# Patient Record
Sex: FEMALE | Race: WHITE | NOT HISPANIC OR LATINO | Employment: OTHER | URBAN - NONMETROPOLITAN AREA
[De-identification: names, ages, dates, MRNs, and addresses within clinical notes are randomized per-mention and may not be internally consistent; named-entity substitution may affect disease eponyms.]

---

## 2020-01-04 ENCOUNTER — HOSPITAL ENCOUNTER (INPATIENT)
Facility: HOSPITAL | Age: 65
LOS: 4 days | Discharge: NURSING, REHAB OR LONG-TERM CARE | DRG: 194 | End: 2020-01-08
Attending: EMERGENCY MEDICINE | Admitting: INTERNAL MEDICINE
Payer: MEDICARE

## 2020-01-04 ENCOUNTER — APPOINTMENT (EMERGENCY)
Dept: CT IMAGING | Facility: HOSPITAL | Age: 65
DRG: 194 | End: 2020-01-04
Payer: MEDICARE

## 2020-01-04 DIAGNOSIS — M54.9 CHRONIC BACK PAIN, UNSPECIFIED BACK LOCATION, UNSPECIFIED BACK PAIN LATERALITY: ICD-10-CM

## 2020-01-04 DIAGNOSIS — M25.511 RIGHT SHOULDER PAIN: ICD-10-CM

## 2020-01-04 DIAGNOSIS — G89.29 CHRONIC BACK PAIN, UNSPECIFIED BACK LOCATION, UNSPECIFIED BACK PAIN LATERALITY: ICD-10-CM

## 2020-01-04 DIAGNOSIS — R26.2 AMBULATORY DYSFUNCTION: ICD-10-CM

## 2020-01-04 DIAGNOSIS — J18.9 PNEUMONIA: Primary | ICD-10-CM

## 2020-01-04 DIAGNOSIS — J44.9 CHRONIC OBSTRUCTIVE PULMONARY DISEASE, UNSPECIFIED COPD TYPE (HCC): ICD-10-CM

## 2020-01-04 DIAGNOSIS — R10.13 DYSPEPSIA: ICD-10-CM

## 2020-01-04 PROBLEM — R29.6 FREQUENT FALLS: Status: ACTIVE | Noted: 2020-01-04

## 2020-01-04 LAB
ALBUMIN SERPL BCP-MCNC: 3.3 G/DL (ref 3.5–5)
ALP SERPL-CCNC: 172 U/L (ref 46–116)
ALT SERPL W P-5'-P-CCNC: 46 U/L (ref 12–78)
ANION GAP SERPL CALCULATED.3IONS-SCNC: 5 MMOL/L (ref 4–13)
APTT PPP: 33 SECONDS (ref 23–37)
AST SERPL W P-5'-P-CCNC: 97 U/L (ref 5–45)
BASOPHILS # BLD AUTO: 0.04 THOUSANDS/ΜL (ref 0–0.1)
BASOPHILS NFR BLD AUTO: 1 % (ref 0–1)
BILIRUB SERPL-MCNC: 0.67 MG/DL (ref 0.2–1)
BILIRUB UR QL STRIP: NEGATIVE
BUN SERPL-MCNC: 18 MG/DL (ref 5–25)
CALCIUM SERPL-MCNC: 8.6 MG/DL (ref 8.3–10.1)
CHLORIDE SERPL-SCNC: 106 MMOL/L (ref 100–108)
CLARITY UR: CLEAR
CO2 SERPL-SCNC: 32 MMOL/L (ref 21–32)
COLOR UR: YELLOW
CREAT SERPL-MCNC: 0.82 MG/DL (ref 0.6–1.3)
EOSINOPHIL # BLD AUTO: 0.11 THOUSAND/ΜL (ref 0–0.61)
EOSINOPHIL NFR BLD AUTO: 2 % (ref 0–6)
ERYTHROCYTE [DISTWIDTH] IN BLOOD BY AUTOMATED COUNT: 15.8 % (ref 11.6–15.1)
FLUAV RNA NPH QL NAA+PROBE: NORMAL
FLUBV RNA NPH QL NAA+PROBE: NORMAL
GFR SERPL CREATININE-BSD FRML MDRD: 75 ML/MIN/1.73SQ M
GLUCOSE SERPL-MCNC: 80 MG/DL (ref 65–140)
GLUCOSE SERPL-MCNC: 82 MG/DL (ref 65–140)
GLUCOSE UR STRIP-MCNC: NEGATIVE MG/DL
HCT VFR BLD AUTO: 35.2 % (ref 34.8–46.1)
HGB BLD-MCNC: 11.4 G/DL (ref 11.5–15.4)
HGB UR QL STRIP.AUTO: NEGATIVE
IMM GRANULOCYTES # BLD AUTO: 0.01 THOUSAND/UL (ref 0–0.2)
IMM GRANULOCYTES NFR BLD AUTO: 0 % (ref 0–2)
INR PPP: 1.14 (ref 0.84–1.19)
KETONES UR STRIP-MCNC: NEGATIVE MG/DL
LACTATE SERPL-SCNC: 0.5 MMOL/L (ref 0.5–2)
LACTATE SERPL-SCNC: 1 MMOL/L (ref 0.5–2)
LEUKOCYTE ESTERASE UR QL STRIP: NEGATIVE
LYMPHOCYTES # BLD AUTO: 1.18 THOUSANDS/ΜL (ref 0.6–4.47)
LYMPHOCYTES NFR BLD AUTO: 23 % (ref 14–44)
MCH RBC QN AUTO: 33.6 PG (ref 26.8–34.3)
MCHC RBC AUTO-ENTMCNC: 32.4 G/DL (ref 31.4–37.4)
MCV RBC AUTO: 104 FL (ref 82–98)
MONOCYTES # BLD AUTO: 0.37 THOUSAND/ΜL (ref 0.17–1.22)
MONOCYTES NFR BLD AUTO: 7 % (ref 4–12)
NEUTROPHILS # BLD AUTO: 3.41 THOUSANDS/ΜL (ref 1.85–7.62)
NEUTS SEG NFR BLD AUTO: 67 % (ref 43–75)
NITRITE UR QL STRIP: NEGATIVE
NRBC BLD AUTO-RTO: 0 /100 WBCS
NT-PROBNP SERPL-MCNC: 2385 PG/ML
PH UR STRIP.AUTO: 6.5 [PH]
PLATELET # BLD AUTO: 115 THOUSANDS/UL (ref 149–390)
PMV BLD AUTO: 10.9 FL (ref 8.9–12.7)
POTASSIUM SERPL-SCNC: 4 MMOL/L (ref 3.5–5.3)
PROT SERPL-MCNC: 6.6 G/DL (ref 6.4–8.2)
PROT UR STRIP-MCNC: NEGATIVE MG/DL
PROTHROMBIN TIME: 14.6 SECONDS (ref 11.6–14.5)
RBC # BLD AUTO: 3.39 MILLION/UL (ref 3.81–5.12)
RSV RNA NPH QL NAA+PROBE: NORMAL
SODIUM SERPL-SCNC: 143 MMOL/L (ref 136–145)
SP GR UR STRIP.AUTO: 1.01 (ref 1–1.03)
TROPONIN I SERPL-MCNC: 0.02 NG/ML
UROBILINOGEN UR QL STRIP.AUTO: 0.2 E.U./DL
WBC # BLD AUTO: 5.12 THOUSAND/UL (ref 4.31–10.16)

## 2020-01-04 PROCEDURE — 72125 CT NECK SPINE W/O DYE: CPT

## 2020-01-04 PROCEDURE — 87086 URINE CULTURE/COLONY COUNT: CPT | Performed by: PHYSICIAN ASSISTANT

## 2020-01-04 PROCEDURE — 74177 CT ABD & PELVIS W/CONTRAST: CPT

## 2020-01-04 PROCEDURE — 99285 EMERGENCY DEPT VISIT HI MDM: CPT

## 2020-01-04 PROCEDURE — 81003 URINALYSIS AUTO W/O SCOPE: CPT | Performed by: PHYSICIAN ASSISTANT

## 2020-01-04 PROCEDURE — 96361 HYDRATE IV INFUSION ADD-ON: CPT

## 2020-01-04 PROCEDURE — 96360 HYDRATION IV INFUSION INIT: CPT

## 2020-01-04 PROCEDURE — 71260 CT THORAX DX C+: CPT

## 2020-01-04 PROCEDURE — 83605 ASSAY OF LACTIC ACID: CPT | Performed by: PHYSICIAN ASSISTANT

## 2020-01-04 PROCEDURE — 84484 ASSAY OF TROPONIN QUANT: CPT | Performed by: PHYSICIAN ASSISTANT

## 2020-01-04 PROCEDURE — 85025 COMPLETE CBC W/AUTO DIFF WBC: CPT | Performed by: PHYSICIAN ASSISTANT

## 2020-01-04 PROCEDURE — 99223 1ST HOSP IP/OBS HIGH 75: CPT | Performed by: NURSE PRACTITIONER

## 2020-01-04 PROCEDURE — 80053 COMPREHEN METABOLIC PANEL: CPT | Performed by: PHYSICIAN ASSISTANT

## 2020-01-04 PROCEDURE — 83880 ASSAY OF NATRIURETIC PEPTIDE: CPT | Performed by: PHYSICIAN ASSISTANT

## 2020-01-04 PROCEDURE — 70450 CT HEAD/BRAIN W/O DYE: CPT

## 2020-01-04 PROCEDURE — 36415 COLL VENOUS BLD VENIPUNCTURE: CPT | Performed by: PHYSICIAN ASSISTANT

## 2020-01-04 PROCEDURE — 87040 BLOOD CULTURE FOR BACTERIA: CPT | Performed by: PHYSICIAN ASSISTANT

## 2020-01-04 PROCEDURE — 85730 THROMBOPLASTIN TIME PARTIAL: CPT | Performed by: PHYSICIAN ASSISTANT

## 2020-01-04 PROCEDURE — 93005 ELECTROCARDIOGRAM TRACING: CPT

## 2020-01-04 PROCEDURE — 85610 PROTHROMBIN TIME: CPT | Performed by: PHYSICIAN ASSISTANT

## 2020-01-04 PROCEDURE — 87631 RESP VIRUS 3-5 TARGETS: CPT | Performed by: PHYSICIAN ASSISTANT

## 2020-01-04 PROCEDURE — 82948 REAGENT STRIP/BLOOD GLUCOSE: CPT

## 2020-01-04 RX ORDER — RIVASTIGMINE 9.5 MG/24H
9.5 PATCH, EXTENDED RELEASE TRANSDERMAL DAILY
Status: DISPENSED | OUTPATIENT
Start: 2020-01-04 | End: 2020-01-05

## 2020-01-04 RX ORDER — ASPIRIN 81 MG/1
81 TABLET, CHEWABLE ORAL DAILY
COMMUNITY

## 2020-01-04 RX ORDER — FUROSEMIDE 20 MG/1
20 TABLET ORAL 2 TIMES DAILY
COMMUNITY

## 2020-01-04 RX ORDER — FUROSEMIDE 10 MG/ML
40 INJECTION INTRAMUSCULAR; INTRAVENOUS ONCE
Status: COMPLETED | OUTPATIENT
Start: 2020-01-04 | End: 2020-01-04

## 2020-01-04 RX ORDER — KETOROLAC TROMETHAMINE 30 MG/ML
60 INJECTION, SOLUTION INTRAMUSCULAR; INTRAVENOUS ONCE
COMMUNITY
End: 2020-01-08 | Stop reason: HOSPADM

## 2020-01-04 RX ORDER — ONDANSETRON 2 MG/ML
4 INJECTION INTRAMUSCULAR; INTRAVENOUS EVERY 4 HOURS PRN
Status: DISCONTINUED | OUTPATIENT
Start: 2020-01-04 | End: 2020-01-08 | Stop reason: HOSPADM

## 2020-01-04 RX ORDER — ACETAMINOPHEN 325 MG/1
650 TABLET ORAL EVERY 4 HOURS PRN
Status: DISCONTINUED | OUTPATIENT
Start: 2020-01-04 | End: 2020-01-08 | Stop reason: HOSPADM

## 2020-01-04 RX ORDER — HYDROMORPHONE HCL 110MG/55ML
0.5 PATIENT CONTROLLED ANALGESIA SYRINGE INTRAVENOUS EVERY 4 HOURS PRN
Status: DISCONTINUED | OUTPATIENT
Start: 2020-01-04 | End: 2020-01-08 | Stop reason: HOSPADM

## 2020-01-04 RX ORDER — PANTOPRAZOLE SODIUM 40 MG/1
40 TABLET, DELAYED RELEASE ORAL DAILY
COMMUNITY

## 2020-01-04 RX ORDER — PREGABALIN 75 MG/1
150 CAPSULE ORAL 3 TIMES DAILY
COMMUNITY

## 2020-01-04 RX ORDER — AZITHROMYCIN 250 MG/1
500 TABLET, FILM COATED ORAL EVERY 24 HOURS
Status: DISCONTINUED | OUTPATIENT
Start: 2020-01-04 | End: 2020-01-08 | Stop reason: HOSPADM

## 2020-01-04 RX ORDER — LEVOFLOXACIN 5 MG/ML
750 INJECTION, SOLUTION INTRAVENOUS ONCE
Status: COMPLETED | OUTPATIENT
Start: 2020-01-04 | End: 2020-01-04

## 2020-01-04 RX ORDER — SERTRALINE HYDROCHLORIDE 100 MG/1
150 TABLET, FILM COATED ORAL DAILY
COMMUNITY

## 2020-01-04 RX ORDER — RIVASTIGMINE 9.5 MG/24H
1 PATCH, EXTENDED RELEASE TRANSDERMAL DAILY
Status: DISCONTINUED | OUTPATIENT
Start: 2020-01-04 | End: 2020-01-04

## 2020-01-04 RX ORDER — LANOLIN ALCOHOL/MO/W.PET/CERES
1000 CREAM (GRAM) TOPICAL DAILY
COMMUNITY

## 2020-01-04 RX ORDER — LORAZEPAM 1 MG/1
1 TABLET ORAL EVERY 8 HOURS PRN
COMMUNITY
End: 2020-01-08 | Stop reason: HOSPADM

## 2020-01-04 RX ORDER — PANTOPRAZOLE SODIUM 40 MG/1
40 TABLET, DELAYED RELEASE ORAL DAILY
Status: DISCONTINUED | OUTPATIENT
Start: 2020-01-05 | End: 2020-01-08 | Stop reason: HOSPADM

## 2020-01-04 RX ORDER — OXYCODONE HYDROCHLORIDE 10 MG/1
10 TABLET ORAL EVERY 4 HOURS PRN
Status: DISCONTINUED | OUTPATIENT
Start: 2020-01-04 | End: 2020-01-08 | Stop reason: HOSPADM

## 2020-01-04 RX ORDER — OXYCODONE HYDROCHLORIDE 5 MG/1
5 TABLET ORAL EVERY 4 HOURS PRN
Status: DISCONTINUED | OUTPATIENT
Start: 2020-01-04 | End: 2020-01-08 | Stop reason: HOSPADM

## 2020-01-04 RX ORDER — RIVASTIGMINE 9.5 MG/24H
1 PATCH, EXTENDED RELEASE TRANSDERMAL DAILY
Status: DISCONTINUED | OUTPATIENT
Start: 2020-01-05 | End: 2020-01-04

## 2020-01-04 RX ORDER — ROSUVASTATIN CALCIUM 10 MG/1
10 TABLET, COATED ORAL DAILY
COMMUNITY

## 2020-01-04 RX ORDER — PRAVASTATIN SODIUM 80 MG/1
80 TABLET ORAL
Status: DISCONTINUED | OUTPATIENT
Start: 2020-01-05 | End: 2020-01-08 | Stop reason: HOSPADM

## 2020-01-04 RX ORDER — PREGABALIN 75 MG/1
75 CAPSULE ORAL 3 TIMES DAILY
Status: DISCONTINUED | OUTPATIENT
Start: 2020-01-04 | End: 2020-01-05

## 2020-01-04 RX ORDER — MEMANTINE HYDROCHLORIDE 5 MG-10 MG
28 KIT ORAL SEE ADMIN INSTRUCTIONS
COMMUNITY

## 2020-01-04 RX ORDER — RIVASTIGMINE 13.3 MG/24H
1 PATCH, EXTENDED RELEASE TRANSDERMAL DAILY
COMMUNITY

## 2020-01-04 RX ORDER — CEFTRIAXONE 1 G/50ML
1000 INJECTION, SOLUTION INTRAVENOUS EVERY 24 HOURS
Status: DISCONTINUED | OUTPATIENT
Start: 2020-01-04 | End: 2020-01-08 | Stop reason: HOSPADM

## 2020-01-04 RX ORDER — OXYCODONE HCL 10 MG/1
10 TABLET, FILM COATED, EXTENDED RELEASE ORAL EVERY 12 HOURS SCHEDULED
COMMUNITY
End: 2020-01-08 | Stop reason: HOSPADM

## 2020-01-04 RX ORDER — ALLOPURINOL 100 MG/1
100 TABLET ORAL 2 TIMES DAILY
Status: DISCONTINUED | OUTPATIENT
Start: 2020-01-04 | End: 2020-01-08 | Stop reason: HOSPADM

## 2020-01-04 RX ORDER — RIVASTIGMINE 13.3 MG/24H
1 PATCH, EXTENDED RELEASE TRANSDERMAL DAILY
Status: DISCONTINUED | OUTPATIENT
Start: 2020-01-05 | End: 2020-01-04 | Stop reason: RX

## 2020-01-04 RX ORDER — ASPIRIN 81 MG/1
81 TABLET, CHEWABLE ORAL DAILY
Status: DISCONTINUED | OUTPATIENT
Start: 2020-01-05 | End: 2020-01-08 | Stop reason: HOSPADM

## 2020-01-04 RX ORDER — OXYBUTYNIN CHLORIDE 5 MG/1
10 TABLET, EXTENDED RELEASE ORAL DAILY
Status: DISCONTINUED | OUTPATIENT
Start: 2020-01-05 | End: 2020-01-08 | Stop reason: HOSPADM

## 2020-01-04 RX ORDER — ALLOPURINOL 100 MG/1
100 TABLET ORAL 2 TIMES DAILY
COMMUNITY

## 2020-01-04 RX ADMIN — FUROSEMIDE 40 MG: 10 INJECTION, SOLUTION INTRAMUSCULAR; INTRAVENOUS at 21:15

## 2020-01-04 RX ADMIN — CEFTRIAXONE 1000 MG: 1 INJECTION, SOLUTION INTRAVENOUS at 23:24

## 2020-01-04 RX ADMIN — LEVOFLOXACIN 750 MG: 5 INJECTION, SOLUTION INTRAVENOUS at 21:19

## 2020-01-04 RX ADMIN — AZITHROMYCIN 500 MG: 250 TABLET, FILM COATED ORAL at 23:09

## 2020-01-04 RX ADMIN — ALLOPURINOL 100 MG: 100 TABLET ORAL at 23:09

## 2020-01-04 RX ADMIN — PREGABALIN 75 MG: 75 CAPSULE ORAL at 23:09

## 2020-01-04 RX ADMIN — OXYCODONE HYDROCHLORIDE 10 MG: 10 TABLET ORAL at 23:09

## 2020-01-04 RX ADMIN — IOHEXOL 100 ML: 350 INJECTION, SOLUTION INTRAVENOUS at 19:05

## 2020-01-04 RX ADMIN — SODIUM CHLORIDE 1000 ML: 0.9 INJECTION, SOLUTION INTRAVENOUS at 18:07

## 2020-01-05 ENCOUNTER — APPOINTMENT (INPATIENT)
Dept: RADIOLOGY | Facility: HOSPITAL | Age: 65
DRG: 194 | End: 2020-01-05
Payer: MEDICARE

## 2020-01-05 PROBLEM — E11.9 DIABETES MELLITUS TYPE 2, DIET-CONTROLLED (HCC): Status: ACTIVE | Noted: 2020-01-05

## 2020-01-05 PROBLEM — F03.90 DEMENTIA (HCC): Status: ACTIVE | Noted: 2020-01-05

## 2020-01-05 PROBLEM — J18.9 PNEUMONIA: Status: ACTIVE | Noted: 2020-01-05

## 2020-01-05 PROBLEM — G89.29 CHRONIC BACK PAIN: Status: ACTIVE | Noted: 2020-01-05

## 2020-01-05 PROBLEM — R79.89 ELEVATED BRAIN NATRIURETIC PEPTIDE (BNP) LEVEL: Status: ACTIVE | Noted: 2020-01-05

## 2020-01-05 PROBLEM — D69.6 THROMBOCYTOPENIA (HCC): Status: ACTIVE | Noted: 2020-01-05

## 2020-01-05 PROBLEM — J44.9 COPD (CHRONIC OBSTRUCTIVE PULMONARY DISEASE) (HCC): Status: ACTIVE | Noted: 2020-01-05

## 2020-01-05 PROBLEM — M54.9 CHRONIC BACK PAIN: Status: ACTIVE | Noted: 2020-01-05

## 2020-01-05 LAB
ALBUMIN SERPL BCP-MCNC: 2.8 G/DL (ref 3.5–5)
ALP SERPL-CCNC: 134 U/L (ref 46–116)
ALT SERPL W P-5'-P-CCNC: 38 U/L (ref 12–78)
ANION GAP SERPL CALCULATED.3IONS-SCNC: 8 MMOL/L (ref 4–13)
AST SERPL W P-5'-P-CCNC: 108 U/L (ref 5–45)
BASOPHILS # BLD AUTO: 0.02 THOUSANDS/ΜL (ref 0–0.1)
BASOPHILS NFR BLD AUTO: 0 % (ref 0–1)
BILIRUB SERPL-MCNC: 0.78 MG/DL (ref 0.2–1)
BUN SERPL-MCNC: 18 MG/DL (ref 5–25)
CALCIUM SERPL-MCNC: 8.4 MG/DL (ref 8.3–10.1)
CHLORIDE SERPL-SCNC: 104 MMOL/L (ref 100–108)
CO2 SERPL-SCNC: 29 MMOL/L (ref 21–32)
CREAT SERPL-MCNC: 0.73 MG/DL (ref 0.6–1.3)
EOSINOPHIL # BLD AUTO: 0.19 THOUSAND/ΜL (ref 0–0.61)
EOSINOPHIL NFR BLD AUTO: 4 % (ref 0–6)
ERYTHROCYTE [DISTWIDTH] IN BLOOD BY AUTOMATED COUNT: 15.8 % (ref 11.6–15.1)
GFR SERPL CREATININE-BSD FRML MDRD: 87 ML/MIN/1.73SQ M
GLUCOSE SERPL-MCNC: 81 MG/DL (ref 65–140)
HCT VFR BLD AUTO: 33.4 % (ref 34.8–46.1)
HGB BLD-MCNC: 11 G/DL (ref 11.5–15.4)
IMM GRANULOCYTES # BLD AUTO: 0.01 THOUSAND/UL (ref 0–0.2)
IMM GRANULOCYTES NFR BLD AUTO: 0 % (ref 0–2)
LYMPHOCYTES # BLD AUTO: 0.73 THOUSANDS/ΜL (ref 0.6–4.47)
LYMPHOCYTES NFR BLD AUTO: 16 % (ref 14–44)
MCH RBC QN AUTO: 34 PG (ref 26.8–34.3)
MCHC RBC AUTO-ENTMCNC: 32.9 G/DL (ref 31.4–37.4)
MCV RBC AUTO: 103 FL (ref 82–98)
MONOCYTES # BLD AUTO: 0.35 THOUSAND/ΜL (ref 0.17–1.22)
MONOCYTES NFR BLD AUTO: 8 % (ref 4–12)
NEUTROPHILS # BLD AUTO: 3.3 THOUSANDS/ΜL (ref 1.85–7.62)
NEUTS SEG NFR BLD AUTO: 72 % (ref 43–75)
NRBC BLD AUTO-RTO: 0 /100 WBCS
PLATELET # BLD AUTO: 84 THOUSANDS/UL (ref 149–390)
PMV BLD AUTO: 10.5 FL (ref 8.9–12.7)
POTASSIUM SERPL-SCNC: 4.1 MMOL/L (ref 3.5–5.3)
PROCALCITONIN SERPL-MCNC: 0.21 NG/ML
PROCALCITONIN SERPL-MCNC: 0.22 NG/ML
PROT SERPL-MCNC: 6.2 G/DL (ref 6.4–8.2)
RBC # BLD AUTO: 3.24 MILLION/UL (ref 3.81–5.12)
SODIUM SERPL-SCNC: 141 MMOL/L (ref 136–145)
WBC # BLD AUTO: 4.6 THOUSAND/UL (ref 4.31–10.16)

## 2020-01-05 PROCEDURE — 85025 COMPLETE CBC W/AUTO DIFF WBC: CPT | Performed by: NURSE PRACTITIONER

## 2020-01-05 PROCEDURE — 84145 PROCALCITONIN (PCT): CPT | Performed by: NURSE PRACTITIONER

## 2020-01-05 PROCEDURE — 80053 COMPREHEN METABOLIC PANEL: CPT | Performed by: NURSE PRACTITIONER

## 2020-01-05 PROCEDURE — 73030 X-RAY EXAM OF SHOULDER: CPT

## 2020-01-05 PROCEDURE — 99232 SBSQ HOSP IP/OBS MODERATE 35: CPT | Performed by: FAMILY MEDICINE

## 2020-01-05 PROCEDURE — 99285 EMERGENCY DEPT VISIT HI MDM: CPT | Performed by: PHYSICIAN ASSISTANT

## 2020-01-05 RX ORDER — PREGABALIN 75 MG/1
150 CAPSULE ORAL 3 TIMES DAILY
Status: DISCONTINUED | OUTPATIENT
Start: 2020-01-05 | End: 2020-01-08 | Stop reason: HOSPADM

## 2020-01-05 RX ORDER — RIVASTIGMINE 9.5 MG/24H
9.5 PATCH, EXTENDED RELEASE TRANSDERMAL DAILY
Status: DISCONTINUED | OUTPATIENT
Start: 2020-01-06 | End: 2020-01-05

## 2020-01-05 RX ORDER — FUROSEMIDE 20 MG/1
20 TABLET ORAL 2 TIMES DAILY
Status: DISCONTINUED | OUTPATIENT
Start: 2020-01-05 | End: 2020-01-08 | Stop reason: HOSPADM

## 2020-01-05 RX ORDER — MEMANTINE HYDROCHLORIDE 10 MG/1
10 TABLET ORAL 2 TIMES DAILY
Status: DISCONTINUED | OUTPATIENT
Start: 2020-01-05 | End: 2020-01-08 | Stop reason: HOSPADM

## 2020-01-05 RX ORDER — CALCIUM CARBONATE 200(500)MG
500 TABLET,CHEWABLE ORAL DAILY PRN
Status: DISCONTINUED | OUTPATIENT
Start: 2020-01-05 | End: 2020-01-08 | Stop reason: HOSPADM

## 2020-01-05 RX ORDER — RIVASTIGMINE 13.3 MG/24H
13.3 PATCH, EXTENDED RELEASE TRANSDERMAL DAILY
Status: COMPLETED | OUTPATIENT
Start: 2020-01-06 | End: 2020-01-07

## 2020-01-05 RX ADMIN — SERTRALINE HYDROCHLORIDE 150 MG: 100 TABLET ORAL at 09:03

## 2020-01-05 RX ADMIN — PRAVASTATIN SODIUM 80 MG: 80 TABLET ORAL at 16:42

## 2020-01-05 RX ADMIN — FUROSEMIDE 20 MG: 20 TABLET ORAL at 17:00

## 2020-01-05 RX ADMIN — PREGABALIN 150 MG: 75 CAPSULE ORAL at 20:31

## 2020-01-05 RX ADMIN — ALLOPURINOL 100 MG: 100 TABLET ORAL at 08:57

## 2020-01-05 RX ADMIN — OXYCODONE HYDROCHLORIDE 10 MG: 10 TABLET ORAL at 04:26

## 2020-01-05 RX ADMIN — ALLOPURINOL 100 MG: 100 TABLET ORAL at 17:00

## 2020-01-05 RX ADMIN — ENOXAPARIN SODIUM 40 MG: 40 INJECTION SUBCUTANEOUS at 08:58

## 2020-01-05 RX ADMIN — CYANOCOBALAMIN TAB 500 MCG 1000 MCG: 500 TAB at 08:57

## 2020-01-05 RX ADMIN — OXYCODONE HYDROCHLORIDE 10 MG: 10 TABLET ORAL at 20:31

## 2020-01-05 RX ADMIN — PANTOPRAZOLE SODIUM 40 MG: 40 TABLET, DELAYED RELEASE ORAL at 09:04

## 2020-01-05 RX ADMIN — PREGABALIN 75 MG: 75 CAPSULE ORAL at 16:41

## 2020-01-05 RX ADMIN — FUROSEMIDE 20 MG: 20 TABLET ORAL at 08:57

## 2020-01-05 RX ADMIN — MEMANTINE 10 MG: 10 TABLET ORAL at 17:00

## 2020-01-05 RX ADMIN — PREGABALIN 75 MG: 75 CAPSULE ORAL at 08:57

## 2020-01-05 RX ADMIN — MEMANTINE 10 MG: 10 TABLET ORAL at 08:59

## 2020-01-05 RX ADMIN — ASPIRIN 81 MG 81 MG: 81 TABLET ORAL at 08:58

## 2020-01-05 RX ADMIN — OXYCODONE HYDROCHLORIDE 10 MG: 10 TABLET ORAL at 12:52

## 2020-01-05 RX ADMIN — CEFTRIAXONE 1000 MG: 1 INJECTION, SOLUTION INTRAVENOUS at 21:31

## 2020-01-05 RX ADMIN — OXYBUTYNIN CHLORIDE 10 MG: 5 TABLET, EXTENDED RELEASE ORAL at 08:57

## 2020-01-05 RX ADMIN — AZITHROMYCIN 500 MG: 250 TABLET, FILM COATED ORAL at 21:31

## 2020-01-05 RX ADMIN — ANTACID TABLETS 500 MG: 500 TABLET, CHEWABLE ORAL at 00:59

## 2020-01-05 NOTE — ASSESSMENT & PLAN NOTE
· Has history of dementia, is oriented x4 on exam  · Uses Exelon patch  Her dose of 13 3 mg per 24 hours is not formulary  Will use available 9 mg patch and patient may use own  Friend will bring in tomorrow  · Takes Namenda 28 mg XR-non formulary  Will use Namenda 10 mg b i d  Digna Bullock   · Supportive care

## 2020-01-05 NOTE — ASSESSMENT & PLAN NOTE
· Initial presentation was for frequent falls and increased weakness  · CT chest abdomen pelvis with contrast:  Right middle and lower lobe opacity suggesting pneumonia  · Initial O2 sat in the ER was 89% on room air    Respirations recorded at 26  · Flu swab:  Negative  · Afebrile and without leukocytosis  · Received Levaquin in the ER  · Blood cultures are pending  · Check procalcitonin  · Obtain sputum culture  · Check urinary respiratory antigens  · Change antibiotics to azithromycin and ceftriaxone  · Deescalate antibiotics as able  · Oxygen protocol

## 2020-01-05 NOTE — PLAN OF CARE
Problem: MUSCULOSKELETAL - ADULT  Goal: Maintain or return mobility to safest level of function  Description  INTERVENTIONS:  - Assess patient's ability to carry out ADLs; assess patient's baseline for ADL function and identify physical deficits which impact ability to perform ADLs (bathing, care of mouth/teeth, toileting, grooming, dressing, etc )  - Assess/evaluate cause of self-care deficits   - Assess range of motion  - Assess patient's mobility  - Assess patient's need for assistive devices and provide as appropriate/roller walker  - Encourage maximum independence but intervene and supervise when necessary  - Involve family in performance of ADLs  - Assess for home care needs following discharge   - Consider OT consult to assist with ADL evaluation and planning for discharge  - Provide patient education as appropriate   Outcome: Not Progressing, history of recent falls unable to ambulate safely  Goal: Maintain proper alignment of affected body part  Description  INTERVENTIONS:  - Support, maintain and protect limb and body alignment  - Provide patient/ family with appropriate education  Outcome: Not Progressing

## 2020-01-05 NOTE — PLAN OF CARE
Problem: Potential for Falls  Goal: Patient will remain free of falls  Description  INTERVENTIONS:  - Assess patient frequently for physical needs  -  Identify cognitive and physical deficits and behaviors that affect risk of falls    -  [place] fall precautions as indicated by assessment   - Educate patient/family on patient safety including physical limitations  - Instruct patient to call for assistance with activity based on assessment  - Modify environment to reduce risk of injury  - Consider OT/PT consult to assist with strengthening/mobility  Outcome: Not Progressing     Problem: PAIN - ADULT  Goal: Verbalizes/displays adequate comfort level or baseline comfort level  Description  Interventions:  - Encourage patient to monitor pain and request assistance  - Assess pain using appropriate pain scale  - Administer analgesics based on type and severity of pain and evaluate response  - Implement non-pharmacological measures as appropriate and evaluate response  - Consider cultural and social influences on pain and pain management  - Notify physician/advanced practitioner if interventions unsuccessful or patient reports new pain  Outcome: Not Progressing     Problem: INFECTION - ADULT  Goal: Absence or prevention of progression during hospitalization  Description  INTERVENTIONS:  - Assess and monitor for signs and symptoms of infection  - Monitor lab/diagnostic results  - Monitor all insertion sites, i e  indwelling lines, tubes, and drains  - Monitor endotracheal if appropriate and nasal secretions for changes in amount and color  - [place] appropriate cooling/warming therapies per order  - Administer medications as ordered  - Instruct and encourage patient and family to use good hand hygiene technique  - Identify and instruct in appropriate isolation precautions for identified infection/condition  Outcome: Not Progressing  Goal: Absence of fever/infection during neutropenic period  Description  INTERVENTIONS:  - Monitor WBC    Outcome: Not Progressing     Problem: SAFETY ADULT  Goal: Maintain or return to baseline ADL function  Description  INTERVENTIONS:  -  Assess patient's ability to carry out ADLs; assess patient's baseline for ADL function and identify physical deficits which impact ability to perform ADLs (bathing, care of mouth/teeth, toileting, grooming, dressing, etc )  - Assess/evaluate cause of self-care deficits   - Assess range of motion  - Assess patient's mobility; develop plan if impaired  - Assess patient's need for assistive devices and provide as appropriate  - Encourage maximum independence but intervene and supervise when necessary  - Involve family in performance of ADLs  - Assess for home care needs following discharge   - Consider OT consult to assist with ADL evaluation and planning for discharge  - Provide patient education as appropriate  Outcome: Not Progressing  Goal: Maintain or return mobility status to optimal level  Description  INTERVENTIONS:  - Assess patient's baseline mobility status (ambulation, transfers, stairs, etc )    - Identify cognitive and physical deficits and behaviors that affect mobility  - Identify mobility aids required to assist with transfers and/or ambulation (gait belt, sit-to-stand, lift, walker, cane, etc )  - [place] fall precautions as indicated by assessment  - Record patient progress and toleration of activity level on Mobility SBAR; progress patient to next Phase/Stage  - Instruct patient to call for assistance with activity based on assessment  - Consider rehabilitation consult to assist with strengthening/weightbearing, etc   Outcome: Not Progressing     Problem: DISCHARGE PLANNING  Goal: Discharge to home or other facility with appropriate resources  Description  INTERVENTIONS:  - Identify barriers to discharge w/patient and caregiver  - Arrange for needed discharge resources and transportation as appropriate  - Identify discharge learning needs (meds, wound care, etc )  - Arrange for interpretive services to assist at discharge as needed  - Refer to Case Management Department for coordinating discharge planning if the patient needs post-hospital services based on physician/advanced practitioner order or complex needs related to functional status, cognitive ability, or social support system  Outcome: Not Progressing     Problem: Knowledge Deficit  Goal: Patient/family/caregiver demonstrates understanding of disease process, treatment plan, medications, and discharge instructions  Description  Complete learning assessment and assess knowledge base    Interventions:  - Provide teaching at level of understanding  - Provide teaching via preferred learning methods  Outcome: Not Progressing     Problem: RESPIRATORY - ADULT  Goal: Achieves optimal ventilation and oxygenation  Description  INTERVENTIONS:  - Assess for changes in respiratory status  - Assess for changes in mentation and behavior  - Position to facilitate oxygenation and minimize respiratory effort  - Oxygen administered by appropriate delivery if ordered  - Initiate smoking cessation education as indicated  - Encourage broncho-pulmonary hygiene including cough, deep breathe, Incentive Spirometry  - Assess the need for suctioning and aspirate as needed  - Assess and instruct to report SOB or any respiratory difficulty  - Respiratory Therapy support as indicated  Outcome: Not Progressing     Problem: SKIN/TISSUE INTEGRITY - ADULT  Goal: Skin integrity remains intact  Description  INTERVENTIONS  - Identify patients at risk for skin breakdown  - Assess and monitor skin integrity  - Assess and monitor nutrition and hydration status  - Monitor labs (i e  albumin)  - Assess for incontinence   - Turn and reposition patient  - Assist with mobility/ambulation  - Relieve pressure over bony prominences  - Avoid friction and shearing  - Provide appropriate hygiene as needed including keeping skin clean and dry  - Evaluate need for skin moisturizer/barrier cream  - Collaborate with interdisciplinary team (i e  Nutrition, Rehabilitation, etc )   - Patient/family teaching  Outcome: Not Progressing  Goal: Incision(s), wounds(s) or drain site(s) healing without S/S of infection  Description  INTERVENTIONS  - Assess and document risk factors for skin impairment   - Assess and document dressing, incision, wound bed, drain sites and surrounding tissue  - Consider nutrition services referral as needed  - Oral mucous membranes remain intact  - Provide patient/ family education  Outcome: Not Progressing  Goal: Oral mucous membranes remain intact  Description  INTERVENTIONS  - Assess oral mucosa and hygiene practices  - Implement preventative oral hygiene regimen  - Implement oral medicated treatments as ordered  - Initiate Nutrition services referral as needed  Outcome: Not Progressing     Problem: MUSCULOSKELETAL - ADULT  Goal: Maintain or return mobility to safest level of function  Description  INTERVENTIONS:  - Assess patient's ability to carry out ADLs; assess patient's baseline for ADL function and identify physical deficits which impact ability to perform ADLs (bathing, care of mouth/teeth, toileting, grooming, dressing, etc )  - Assess/evaluate cause of self-care deficits   - Assess range of motion  - Assess patient's mobility  - Assess patient's need for assistive devices and provide as appropriate  - Encourage maximum independence but intervene and supervise when necessary  - Involve family in performance of ADLs  - Assess for home care needs following discharge   - Consider OT consult to assist with ADL evaluation and planning for discharge  - Provide patient education as appropriate  Outcome: Not Progressing  Goal: Maintain proper alignment of affected body part  Description  INTERVENTIONS:  - Support, maintain and protect limb and body alignment  - Provide patient/ family with appropriate education  Outcome: Not Progressing

## 2020-01-05 NOTE — NURSING NOTE
Patient became agitated when her belongings would not be removed from sealed plastic bags and stated that this place was becoming a "shit-show" and wanted to leave  It was explained that she had the right to leave if she was not happy and then stated that I should come over to her so that she could "punch me in the throat"  The nursing supervisor was immediately notified and she went in to talk to the patient

## 2020-01-05 NOTE — ASSESSMENT & PLAN NOTE
· BNP 2385  · Does not appear volume overloaded on exam  · No history of CHF and old records  · BNP from September 2019 is 3600  · Is on Lasix daily-will continue  · Check echocardiogram

## 2020-01-05 NOTE — PROGRESS NOTES
Progress Note - Jaz Marrow 1955, 71 y o  female MRN: [id number]    Unit/Bed#: -01 Encounter: [id number]    Primary Care Provider: Sulma Cevallos MD   Date and time admitted to hospital: 1/4/2020  4:49 PM        * Frequent falls  Assessment & Plan  · Initial presentation was for frequent falls and increased weakness  · PT, OT, case management consult  · Will likely need rehab    COPD (chronic obstructive pulmonary disease) ([facility])  Assessment & Plan  · Quit smoking 2 years ago  · Does not appear to be in exacerbation  · CT chest abdomen pelvis shows mild-to-moderate emphysema  · Oxygen protocol  · Respiratory protocol  · Monitor    Elevated brain natriuretic peptide (BNP) level  Assessment & Plan  · BNP 2385  · Does not appear volume overloaded on exam  · No history of CHF and old records  · BNP from September 2019 is 3600  · Is on Lasix daily-will continue  · Check echocardiogram    Diabetes mellitus type 2, diet-controlled ([facility] [address] )  Assessment & Plan  No results found for: HGBA1C    Recent Labs     01/04/20  1709   POCGLU 82       Blood Sugar Average: Last 72 hrs:  (P) 82   · Patient refuses diabetic diet  · Blood sugar on BMP appears to be stable    Dementia ([facility])  Assessment & Plan  · Has history of dementia, is oriented x4 on exam  · Uses Exelon patch  Her dose of 13 3 mg per 24 hours is not formulary  Will use available 9 mg patch and patient may use own  Friend will bring in tomorrow  · Takes Namenda 28 mg XR-non formulary  Will use Namenda 10 mg b i d  [facility] · Supportive care    Pneumonia  Assessment & Plan  · Initial presentation was for frequent falls and increased weakness  · CT chest abdomen pelvis with contrast:  Right middle and lower lobe opacity suggesting pneumonia  · Initial O2 sat in the ER was 89% on room air    Respirations recorded at 26  · Flu swab:  Negative  · Afebrile and without leukocytosis  · Received Levaquin in the ER  · Patient do not have any respiratory symptoms  · Continue with the antibiotics for now  · Follow-up on the procalcitonin  · Deescalate antibiotics quickly    Chronic back pain  Assessment & Plan  · History of chronic back pain due to disc disease  · Takes oxycodone 10 mg p r n  At home  · Will use Adult Pain Management set    Thrombocytopenia (HCC)  Assessment & Plan  · Platelets 850-LLIW to 84  · Daily CBC and monitor platelets      VTE Pharmacologic Prophylaxis:   Pharmacologic: Enoxaparin (Lovenox)  Mechanical VTE Prophylaxis in Place: Yes    Patient Centered Rounds: I have performed bedside rounds with nursing staff today  Discussions with Specialists or Other Care Team Provider:     Education and Discussions with Family / Patient:  Patient    Time Spent for Care: 30 minutes  More than 50% of total time spent on counseling and coordination of care as described above  Current Length of Stay: 1 day(s)    Current Patient Status: Inpatient   Certification Statement: The patient will continue to require additional inpatient hospital stay due to IV antibiotics    Discharge Plan:     Code Status: Level 1 - Full Code      Subjective:   Patient seen and examined  Patient denies any cough or any respiratory symptoms  Denies any fever  Patient came to the hospital with the recurrent falls  CT scan is suspicious for pneumonia  Patient is also complaining of right shoulder pain and decreased range of movement  Objective:     Vitals:   Temp (24hrs), Av 3 °F (36 8 °C), Min:97 6 °F (36 4 °C), Max:99 °F (37 2 °C)    Temp:  [97 6 °F (36 4 °C)-99 °F (37 2 °C)] 99 °F (37 2 °C)  HR:  [60-72] 64  Resp:  [16-26] 18  BP: (112-171)/(58-93) 112/60  SpO2:  [89 %-98 %] 95 %  Body mass index is 40 13 kg/m²  Input and Output Summary (last 24 hours):        Intake/Output Summary (Last 24 hours) at 2020 1652  Last data filed at 2020 1401  Gross per 24 hour   Intake 1000 ml   Output 925 ml   Net 75 ml       Physical Exam:     Physical Exam Constitutional: She appears well-developed  HENT:   Wound on the scalp from previous fall, appears to be healing   Eyes: Pupils are equal, round, and reactive to light  Neck: No JVD present  Cardiovascular: Normal rate  No murmur heard  Pulmonary/Chest:   Decreased breath sounds bilateral   Abdominal: Soft  A hernia is present  Ventral hernia   Musculoskeletal:   Decreased range of movement of the right shoulder  Wounds on the right lower extremity-appears to be scabbed and healing   Neurological: She is alert  Skin: Skin is warm  Additional Data:     Labs:    Results from last 7 days   Lab Units 01/05/20  0647   WBC Thousand/uL 4 60   HEMOGLOBIN g/dL 11 0*   HEMATOCRIT % 33 4*   PLATELETS Thousands/uL 84*   NEUTROS PCT % 72   LYMPHS PCT % 16   MONOS PCT % 8   EOS PCT % 4     Results from last 7 days   Lab Units 01/05/20  0647   POTASSIUM mmol/L 4 1   CHLORIDE mmol/L 104   CO2 mmol/L 29   BUN mg/dL 18   CREATININE mg/dL 0 73   CALCIUM mg/dL 8 4   ALK PHOS U/L 134*   ALT U/L 38   AST U/L 108*     Results from last 7 days   Lab Units 01/04/20  1758   INR  1 14       * I Have Reviewed All Lab Data Listed Above  * Additional Pertinent Lab Tests Reviewed: Jenna 66 Admission Reviewed    Imaging:    Imaging Reports Reviewed Today Include:   Imaging Personally Reviewed by Myself Includes:    Recent Cultures (last 7 days):     Results from last 7 days   Lab Units 01/04/20  1757 01/04/20  1756   BLOOD CULTURE  Received in Microbiology Lab  Culture in Progress  Received in Microbiology Lab  Culture in Progress         Last 24 Hours Medication List:     Current Facility-Administered Medications:  acetaminophen 650 mg Oral Q4H PRN Yahir Huddle, CRNP    allopurinol 100 mg Oral BID [place] Huddle, CRNP    aspirin 81 mg Oral Daily Yahir Huddle, CRNP    cefTRIAXone 1,000 mg Intravenous Q24H Yahir Huddle, CRNP Last Rate: 1,000 mg (01/04/20 1735)   And        azithromycin 500 mg Oral Q24H Tresea Pill, CRNP    calcium carbonate 500 mg Oral Daily PRN Tresea Pill, CRNP    vitamin B-12 1,000 mcg Oral Daily Tresea Pill, CRNP    enoxaparin 40 mg Subcutaneous Daily Tresea Pill, CRNP    furosemide 20 mg Oral BID Tresea Pill, CRNP    HYDROmorphone 0 5 mg Intravenous Q4H PRN Tresea Pill, CRNP    memantine 10 mg Oral BID Tresea Pill, CRNP    naloxone 0 04 mg Intravenous Q1MIN PRN Tresea Pill, CRNP    NON FORMULARY 13 3 mg/24 hr Transdermal QPM Tresea Pill, CRNP    ondansetron 4 mg Intravenous Q4H PRN Tresea Pill, CRNP    oxybutynin 10 mg Oral Daily Tresea Pill, CRNP    oxyCODONE 10 mg Oral Q4H PRN Tresea Pill, CRNP    oxyCODONE 5 mg Oral Q4H PRN Tresea Pill, CRNP    pantoprazole 40 mg Oral Daily Tresea Pill, CRNP    pravastatin 80 mg Oral Daily With [facility], CRNP    pregabalin 75 mg Oral TID Tresea Pill, CRNP    [START ON 1/6/2020] rivastigmine 9 5 mg Transdermal Daily Sharif Jesus MD    sertraline 150 mg Oral Daily Tresea Pill, CRNP         Today, Patient Was Seen By: Sharif Jesus MD    ** Please Note: Dictation voice to text software may have been used in the creation of this document   **

## 2020-01-05 NOTE — ASSESSMENT & PLAN NOTE
· Has history of dementia, is oriented x4 on exam  · Uses Exelon patch  Her dose of 13 3 mg per 24 hours is not formulary  Will use available 9 mg patch and patient may use own  Friend will bring in tomorrow  · Takes Namenda 28 mg XR-non formulary  Will use Namenda 10 mg b i d  Bacilio Ramirez   · Supportive care

## 2020-01-05 NOTE — ASSESSMENT & PLAN NOTE
· Quit smoking 2 years ago  · Does not appear to be in exacerbation  · CT chest abdomen pelvis shows mild-to-moderate emphysema  · Oxygen protocol  · Respiratory protocol  · Monitor

## 2020-01-05 NOTE — ASSESSMENT & PLAN NOTE
No results found for: HGBA1C    Recent Labs     01/04/20  1709   POCGLU 82       Blood Sugar Average: Last 72 hrs:  (P) 82   · Patient refuses diabetic diet  · Blood sugar on BMP appears to be stable

## 2020-01-05 NOTE — ASSESSMENT & PLAN NOTE
· Initial presentation was for frequent falls and increased weakness  · PT, OT, case management consult  · Will likely need rehab

## 2020-01-05 NOTE — H&P
[facility] Internal Medicine    H&P- Chacha Chamberlain 1955, 71 y o  female MRN: [id number]    Unit/Bed#: -01 Encounter: [id number]    Primary Care Provider: Jose Vargas MD   Date and time admitted to hospital: 1/4/2020  4:49 PM        * Pneumonia  Assessment & Plan  · Initial presentation was for frequent falls and increased weakness  · CT chest abdomen pelvis with contrast:  Right middle and lower lobe opacity suggesting pneumonia  · Initial O2 sat in the ER was 89% on room air    Respirations recorded at 26  · Flu swab:  Negative  · Afebrile and without leukocytosis  · Received Levaquin in the ER  · Blood cultures are pending  · Check procalcitonin  · Obtain sputum culture  · Check urinary respiratory antigens  · Change antibiotics to azithromycin and ceftriaxone  · Deescalate antibiotics as able  · Oxygen protocol    Frequent falls  Assessment & Plan  · Initial presentation was for frequent falls and increased weakness  · PT, OT, case management consult  · Will likely need rehab    Thrombocytopenia (HCC)  Assessment & Plan  · Platelets 399  · Daily CBC and monitor platelets    COPD (chronic obstructive pulmonary disease) (HCC)  Assessment & Plan  · Quit smoking 2 years ago  · Does not appear to be in exacerbation  · CT chest abdomen pelvis shows mild-to-moderate emphysema  · Oxygen protocol  · Respiratory protocol  · Monitor    Elevated brain natriuretic peptide (BNP) level  Assessment & Plan  · BNP 2385  · Does not appear volume overloaded on exam  · No history of CHF and old records  · BNP from September 2019 is 3600  · Is on Lasix daily-will continue  · Check echocardiogram    Diabetes mellitus type 2, diet-controlled ([facility] )  Assessment & Plan  No results found for: HGBA1C    Recent Labs     01/04/20  1709   POCGLU 82       Blood Sugar Average: Last 72 hrs:  (P) 82     · Carb controlled diet  · Daily metabolic panel and trend glucose    Dementia (HCC)  Assessment & Plan  · Has history of dementia, is oriented x4 on exam  · Uses Exelon patch  Her dose of 13 3 mg per 24 hours is not formulary  Will use available 9 mg patch and patient may use own  Friend will bring in tomorrow  · Takes Namenda 28 mg XR-non formulary  Will use Namenda 10 mg b i d  Radha Sprague · Supportive care    Chronic back pain  Assessment & Plan  · History of chronic back pain due to disc disease  · Takes oxycodone 10 mg p r n  At home  · Will use Adult Pain Management set      VTE Prophylaxis: Enoxaparin (Lovenox)  / sequential compression device   Code Status:  Full  POLST: POLST form is not discussed and not completed at this time  Discussion with family:  Patient    Anticipated Length of Stay:  Patient will be admitted on an Inpatient basis with an anticipated length of stay of  greater than 2 midnights  Justification for Hospital Stay:  As described in plan above    Total Time for Visit, including Counseling / Coordination of Care: 45 minutes  Greater than 50% of this total time spent on direct patient counseling and coordination of care  Chief Complaint:   Frequent falls    History of Present Illness:    Lexx Shlel is a 71 y o  female with history of diet-controlled diabetes, COPD, dementia, and ventral hernias who presents with frequent falls  She says that she has become progressively more weak over the past several weeks  She normally ambulates with a walker and is finding it difficult to ambulate at all  She says she may have had a fever, and has had a nonproductive cough  She denies chest pain  No nausea or vomiting  Denies diarrhea  Denies palpitations or leg swelling  Denies focal weakness  Review of Systems:    Review of Systems   Constitutional: Positive for fatigue  Negative for chills and fever  Respiratory: Positive for cough  Negative for shortness of breath  Cardiovascular: Negative for chest pain, palpitations and leg swelling     Gastrointestinal: Negative for abdominal distention, abdominal pain, constipation, diarrhea, nausea and vomiting  Genitourinary: Negative for dysuria and frequency  Musculoskeletal: Positive for gait problem  Negative for arthralgias and myalgias  Neurological: Positive for weakness  Negative for dizziness, syncope and headaches  Psychiatric/Behavioral: Negative for dysphoric mood  All other systems reviewed and are negative  Past Medical and Surgical History:     Past Medical History:   Diagnosis Date    Cardiac disease     Spinal stenosis        No past surgical history on file  Meds/Allergies:    Prior to Admission medications    Medication Sig Start Date End Date Taking? Authorizing Provider   allopurinol (ZYLOPRIM) 100 mg tablet Take 100 mg by mouth 2 (two) times a day    Historical Provider, MD   aspirin 81 mg chewable tablet Chew 81 mg daily    Historical Provider, MD   furosemide (LASIX) 20 mg tablet Take 20 mg by mouth 2 (two) times a day    Historical Provider, MD   ketorolac (TORADOL) 30 mg/mL injection Inject 60 mg into a muscle once    Historical Provider, MD   LORazepam (ATIVAN) 1 mg tablet Take 1 mg by mouth every 8 (eight) hours as needed for anxiety    Historical Provider, MD   memantine (NAMENDA TITRATION PACK) Take 28 mg by mouth see administration instructions Follow package directions      Historical Provider, MD   Mirabegron ER 50 MG TB24 Take 50 mg by mouth daily    Historical Provider, MD   oxyCODONE (OxyCONTIN) 10 mg 12 hr tablet Take 10 mg by mouth every 12 (twelve) hours    Historical Provider, MD   pantoprazole (PROTONIX) 40 mg tablet Take 40 mg by mouth daily    Historical Provider, MD   pregabalin (LYRICA) 75 mg capsule Take 75 mg by mouth 3 (three) times a day    Historical Provider, MD   rivastigmine (EXELON) 13 3 mg/24 hr TD 24 hr patch Place 1 patch on the skin daily    Historical Provider, MD   rosuvastatin (CRESTOR) 10 MG tablet Take 10 mg by mouth daily    Historical Provider, MD   sertraline (ZOLOFT) 100 mg tablet Take 150 mg by mouth daily    Historical Provider, MD   vitamin B-12 (VITAMIN B-12) 1,000 mcg tablet Take 1,000 mcg by mouth daily    Historical Provider, MD     I have reviewed home medications with patient personally  Allergies: No Known Allergies    Social History:     Marital Status:    Occupation:  Retired Front office medical  Patient Pre-hospital Living Situation:  Lives at home with live-in caregiver of 20 years  Patient Pre-hospital Level of Mobility:  Walks with walker  Patient Pre-hospital Diet Restrictions:  No concentrated sweets  Substance Use History:   Social History     Substance and Sexual Activity   Alcohol Use Never    Frequency: Never     Social History     Tobacco Use   Smoking Status Unknown If Ever Smoked   Smokeless Tobacco Never Used     Social History     Substance and Sexual Activity   Drug Use Never       Family History:    History reviewed  No pertinent family history  Physical Exam:     Vitals:   Blood Pressure: 142/85 (01/05/20 0001)  Pulse: 60 (01/05/20 0001)  Temperature: 98 2 °F (36 8 °C) (01/05/20 0001)  Respirations: 16 (01/05/20 0001)  Height: 5' 3 5" (161 3 cm) (01/05/20 0001)  Weight - Scale: 104 kg (230 lb 2 6 oz) (01/05/20 0600)  SpO2: 94 % (01/05/20 0001)    Physical Exam   Constitutional: She is oriented to person, place, and time  She appears well-developed and well-nourished  HENT:   Head: Normocephalic and atraumatic  Mouth/Throat: Oropharynx is clear and moist    Eyes: Pupils are equal, round, and reactive to light  EOM are normal    Neck: Normal range of motion  Neck supple  Cardiovascular: Normal rate, regular rhythm, normal heart sounds and intact distal pulses  Exam reveals no gallop and no friction rub  No murmur heard  Pulmonary/Chest: Effort normal and breath sounds normal  No respiratory distress  Abdominal: Soft  Bowel sounds are normal  She exhibits mass  She exhibits no distension  There is no tenderness  There is no guarding  Two large soft nontender masses to mid to left abdomen   Musculoskeletal: Normal range of motion  She exhibits no edema, tenderness or deformity  Neurological: She is alert and oriented to person, place, and time  Skin: Skin is warm and dry  Capillary refill takes less than 2 seconds  Multiple scabbed areas to right lower extremity + evidence of venous stasis brown discoloration   Nursing note and vitals reviewed  Additional Data:     Lab Results: I have personally reviewed pertinent reports  Results from last 7 days   Lab Units 01/04/20  1757   WBC Thousand/uL 5 12   HEMOGLOBIN g/dL 11 4*   HEMATOCRIT % 35 2   PLATELETS Thousands/uL 115*   NEUTROS PCT % 67   LYMPHS PCT % 23   MONOS PCT % 7   EOS PCT % 2     Results from last 7 days   Lab Units 01/04/20  1758   SODIUM mmol/L 143   POTASSIUM mmol/L 4 0   CHLORIDE mmol/L 106   CO2 mmol/L 32   BUN mg/dL 18   CREATININE mg/dL 0 82   ANION GAP mmol/L 5   CALCIUM mg/dL 8 6   ALBUMIN g/dL 3 3*   TOTAL BILIRUBIN mg/dL 0 67   ALK PHOS U/L 172*   ALT U/L 46   AST U/L 97*   GLUCOSE RANDOM mg/dL 80     Results from last 7 days   Lab Units 01/04/20  1758   INR  1 14     Results from last 7 days   Lab Units 01/04/20  1709   POC GLUCOSE mg/dl 82         Results from last 7 days   Lab Units 01/04/20  1958 01/04/20  1759   LACTIC ACID mmol/L 1 0 0 5       Imaging: I have personally reviewed pertinent reports  CT head without contrast   Final Result by Donte Brink MD (01/04 1020)      No acute intracranial abnormality  Chronic right basal ganglia lacunar infarct  Workstation performed: [id number]         CT cervical spine without contrast   Final Result by Donte Brink MD (01/04 1919)      No cervical spine fracture or traumatic malalignment  Workstation performed: [id number]         CT chest abdomen pelvis w contrast   Final Result by Donte Brink MD (01/04 1925)      No traumatic abnormality identified        Mild to moderate emphysema  Right middle and lower lobe opacities suggesting pneumonia  Cardiomegaly  Cholelithiasis  Ventral hernias as described  Workstation performed: [id number]             EKG, Pathology, and Other Studies Reviewed on Admission:   · CT head:  No acute intracranial abnormality  Chronic right basal ganglia lacunar infarct  Allscripts / Epic Records Reviewed: Yes     ** Please Note: This note has been constructed using a voice recognition system   **

## 2020-01-05 NOTE — PLAN OF CARE
Problem: Potential for Falls  Goal: Patient will remain free of falls  Description  INTERVENTIONS:  - Assess patient frequently for physical needs  -  Identify cognitive and physical deficits and behaviors that affect risk of falls    -  [place] fall precautions as indicated by assessment   - Educate patient/family on patient safety including physical limitations  - Instruct patient to call for assistance with activity based on assessment  - Modify environment to reduce risk of injury  - Consider OT/PT consult to assist with strengthening/mobility  Outcome: Progressing     Problem: PAIN - ADULT  Goal: Verbalizes/displays adequate comfort level or baseline comfort level  Description  Interventions:  - Encourage patient to monitor pain and request assistance  - Assess pain using appropriate pain scale  - Administer analgesics based on type and severity of pain and evaluate response  - Implement non-pharmacological measures as appropriate and evaluate response  - Consider cultural and social influences on pain and pain management  - Notify physician/advanced practitioner if interventions unsuccessful or patient reports new pain  Outcome: Progressing     Problem: INFECTION - ADULT  Goal: Absence or prevention of progression during hospitalization  Description  INTERVENTIONS:  - Assess and monitor for signs and symptoms of infection  - Monitor lab/diagnostic results  - Monitor all insertion sites, i e  indwelling lines, tubes, and drains  - Monitor endotracheal if appropriate and nasal secretions for changes in amount and color  - [place] appropriate cooling/warming therapies per order  - Administer medications as ordered  - Instruct and encourage patient and family to use good hand hygiene technique  - Identify and instruct in appropriate isolation precautions for identified infection/condition  Outcome: Progressing  Goal: Absence of fever/infection during neutropenic period  Description  INTERVENTIONS:  - Monitor WBC    Outcome: Progressing     Problem: SAFETY ADULT  Goal: Maintain or return to baseline ADL function  Description  INTERVENTIONS:  -  Assess patient's ability to carry out ADLs; assess patient's baseline for ADL function and identify physical deficits which impact ability to perform ADLs (bathing, care of mouth/teeth, toileting, grooming, dressing, etc )  - Assess/evaluate cause of self-care deficits   - Assess range of motion  - Assess patient's mobility; develop plan if impaired  - Assess patient's need for assistive devices and provide as appropriate  - Encourage maximum independence but intervene and supervise when necessary  - Involve family in performance of ADLs  - Assess for home care needs following discharge   - Consider OT consult to assist with ADL evaluation and planning for discharge  - Provide patient education as appropriate  Outcome: Progressing  Goal: Maintain or return mobility status to optimal level  Description  INTERVENTIONS:  - Assess patient's baseline mobility status (ambulation, transfers, stairs, etc )    - Identify cognitive and physical deficits and behaviors that affect mobility  - Identify mobility aids required to assist with transfers and/or ambulation (gait belt, sit-to-stand, lift, walker, cane, etc )  - [place] fall precautions as indicated by assessment  - Record patient progress and toleration of activity level on Mobility SBAR; progress patient to next Phase/Stage  - Instruct patient to call for assistance with activity based on assessment  - Consider rehabilitation consult to assist with strengthening/weightbearing, etc   Outcome: Progressing     Problem: DISCHARGE PLANNING  Goal: Discharge to home or other facility with appropriate resources  Description  INTERVENTIONS:  - Identify barriers to discharge w/patient and caregiver  - Arrange for needed discharge resources and transportation as appropriate  - Identify discharge learning needs (meds, wound care, etc )  - Arrange for interpretive services to assist at discharge as needed  - Refer to Case Management Department for coordinating discharge planning if the patient needs post-hospital services based on physician/advanced practitioner order or complex needs related to functional status, cognitive ability, or social support system  Outcome: Progressing     Problem: Knowledge Deficit  Goal: Patient/family/caregiver demonstrates understanding of disease process, treatment plan, medications, and discharge instructions  Description  Complete learning assessment and assess knowledge base    Interventions:  - Provide teaching at level of understanding  - Provide teaching via preferred learning methods  Outcome: Progressing

## 2020-01-05 NOTE — ED PROVIDER NOTES
History  Chief Complaint   Patient presents with    Fall     leg and back weakness  Frequent falls last few weeks  The patient is a 11-year-old female with a past medical history of spinal stenosis, who presented from home for the concern of several falls via ems  The patient over the last 2 weeks has had numerous falls at home  Patient states that she is having increased difficulty with ambulation where while ambulating with a walker she becomes weak and dizzy did lower to the ground or falls  Most recent fall was prior to arrival   Patient states she was ambulating with a walker and her lower legs became heavy therefore she was lowered to the ground  The family states that she typically needs minimal assistance but over the last week she has been requiring more  The patient currently is complaining of low back pain  Fall   Mechanism of injury: fall    Injury location:  Pelvis  Pelvic injury location:  R buttock and L buttock  Incident location:  Home  Time since incident:  2 hours  Arrived directly from scene: yes    Fall:     Fall occurred:  Standing    Impact surface:  Hard floor    Point of impact:  Back    Entrapped after fall: no    Protective equipment: none    Suspicion of alcohol use: no    Suspicion of drug use: no    Tetanus status:  Up to date  Associated symptoms: back pain    Associated symptoms: no abdominal pain, no blindness, no chest pain, no difficulty breathing, no headaches, no hearing loss, no loss of consciousness, no nausea, no neck pain, no seizures and no vomiting    Risk factors: no kidney disease        Prior to Admission Medications   Prescriptions Last Dose Informant Patient Reported? Taking?    LORazepam (ATIVAN) 1 mg tablet Unknown at Unknown time  Yes No   Sig: Take 1 mg by mouth every 8 (eight) hours as needed for anxiety   Mirabegron ER 50 MG TB24 Unknown at Unknown time  Yes No   Sig: Take 50 mg by mouth daily   allopurinol (ZYLOPRIM) 100 mg tablet Unknown at Unknown time  Yes No   Sig: Take 100 mg by mouth 2 (two) times a day   aspirin 81 mg chewable tablet Unknown at Unknown time  Yes No   Sig: Chew 81 mg daily   furosemide (LASIX) 20 mg tablet Not Taking at Unknown time  Yes No   Sig: Take 20 mg by mouth 2 (two) times a day   ketorolac (TORADOL) 30 mg/mL injection Unknown at Unknown time  Yes No   Sig: Inject 60 mg into a muscle once   memantine (NAMENDA TITRATION PACK) Unknown at Unknown time  Yes No   Sig: Take 28 mg by mouth see administration instructions Follow package directions  oxyCODONE (OxyCONTIN) 10 mg 12 hr tablet Unknown at Unknown time  Yes No   Sig: Take 10 mg by mouth every 12 (twelve) hours   pantoprazole (PROTONIX) 40 mg tablet Unknown at Unknown time  Yes No   Sig: Take 40 mg by mouth daily   pregabalin (LYRICA) 75 mg capsule Unknown at Unknown time  Yes No   Sig: Take 75 mg by mouth 3 (three) times a day   rivastigmine (EXELON) 13 3 mg/24 hr TD 24 hr patch Unknown at Unknown time  Yes No   Sig: Place 1 patch on the skin daily   rosuvastatin (CRESTOR) 10 MG tablet Unknown at Unknown time  Yes No   Sig: Take 10 mg by mouth daily   sertraline (ZOLOFT) 100 mg tablet Unknown at Unknown time  Yes No   Sig: Take 150 mg by mouth daily   vitamin B-12 (VITAMIN B-12) 1,000 mcg tablet Unknown at Unknown time  Yes No   Sig: Take 1,000 mcg by mouth daily      Facility-Administered Medications: None       Past Medical History:   Diagnosis Date    Cardiac disease     Spinal stenosis        No past surgical history on file  History reviewed  No pertinent family history  I have reviewed and agree with the history as documented  Social History     Tobacco Use    Smoking status: Unknown If Ever Smoked    Smokeless tobacco: Never Used   Substance Use Topics    Alcohol use: Never     Frequency: Never    Drug use: Never        Review of Systems   Constitutional: Negative for chills and fever  HENT: Negative for ear pain, hearing loss and sore throat  Eyes: Negative for blindness, pain and visual disturbance  Respiratory: Negative for cough, shortness of breath and wheezing  Cardiovascular: Negative for chest pain, palpitations and leg swelling  Gastrointestinal: Negative for abdominal pain, nausea and vomiting  Genitourinary: Negative for dysuria and hematuria  Musculoskeletal: Positive for back pain  Negative for arthralgias and neck pain  Skin: Negative for color change and rash  Neurological: Negative for dizziness, seizures, loss of consciousness, syncope and headaches  Physical Exam  Physical Exam   Constitutional: She is oriented to person, place, and time  She appears well-developed and well-nourished  No distress  HENT:   Head: Normocephalic and atraumatic  Right Ear: External ear normal    Left Ear: External ear normal    Mouth/Throat: Oropharynx is clear and moist    Eyes: Pupils are equal, round, and reactive to light  EOM are normal    Neck: Normal range of motion  Neck supple  Cardiovascular: Normal rate, regular rhythm and intact distal pulses  No murmur heard  Pulmonary/Chest: Effort normal  No respiratory distress  She has decreased breath sounds  She has no wheezes  Abdominal: Soft  Bowel sounds are normal  She exhibits no mass  There is no tenderness  There is no rebound  No hernia  Musculoskeletal:        Right hip: Normal         Left hip: Normal         Thoracic back: Normal         Lumbar back: She exhibits tenderness and bony tenderness  She exhibits no deformity  Neurological: She is alert and oriented to person, place, and time  She has normal strength  No cranial nerve deficit or sensory deficit  GCS eye subscore is 4  GCS verbal subscore is 5  GCS motor subscore is 6  Skin: Skin is warm and dry  Capillary refill takes less than 2 seconds  Psychiatric: She has a normal mood and affect  Her behavior is normal    Nursing note and vitals reviewed        Vital Signs  ED Triage Vitals   Temperature Pulse Respirations Blood Pressure SpO2   01/04/20 1704 01/04/20 1704 01/04/20 1704 01/04/20 1704 01/04/20 1704   97 6 °F (36 4 °C) 72 20 154/93 (!) 89 %      Temp src Heart Rate Source Patient Position - Orthostatic VS BP Location FiO2 (%)   -- 01/04/20 1930 01/04/20 1930 01/04/20 1930 --    Monitor Lying Right arm       Pain Score       01/04/20 1704       9           Vitals:    01/04/20 2045 01/05/20 0001 01/05/20 0741 01/05/20 0900   BP: 142/82 142/85 113/58    Pulse: 69 60 62 61   Patient Position - Orthostatic VS:             Visual Acuity      ED Medications  Medications   allopurinol (ZYLOPRIM) tablet 100 mg (100 mg Oral Given 1/5/20 0857)   aspirin chewable tablet 81 mg (81 mg Oral Given 1/5/20 0858)   oxybutynin (DITROPAN-XL) 24 hr tablet 10 mg (10 mg Oral Given 1/5/20 0857)   pravastatin (PRAVACHOL) tablet 80 mg (has no administration in time range)   sertraline (ZOLOFT) tablet 150 mg (150 mg Oral Given 1/5/20 0903)   cyanocobalamin (VITAMIN B-12) tablet 1,000 mcg (1,000 mcg Oral Given 1/5/20 0857)   pregabalin (LYRICA) capsule 75 mg (75 mg Oral Given 1/5/20 0857)   pantoprazole (PROTONIX) EC tablet 40 mg (40 mg Oral Given 1/5/20 0904)   naloxone (NARCAN) 0 04 mg/mL syringe 0 04 mg (has no administration in time range)   ondansetron (ZOFRAN) injection 4 mg (has no administration in time range)   enoxaparin (LOVENOX) subcutaneous injection 40 mg (40 mg Subcutaneous Given 1/5/20 0858)   acetaminophen (TYLENOL) tablet 650 mg (has no administration in time range)   oxyCODONE (ROXICODONE) IR tablet 5 mg (has no administration in time range)   HYDROmorphone (DILAUDID) injection 0 5 mg (has no administration in time range)   oxyCODONE (ROXICODONE) immediate release tablet 10 mg (10 mg Oral Given 1/5/20 1846)   cefTRIAXone (ROCEPHIN) IVPB (premix) 1,000 mg (1,000 mg Intravenous New Bag 1/4/20 8470)     And   azithromycin (ZITHROMAX) tablet 500 mg (500 mg Oral Given 1/4/20 2660)   NON FORMULARY (has no administration in time range)   rivastigmine (EXELON) 9 5 mg/24 hr TD 24 hr patch 9 5 mg (has no administration in time range)   calcium carbonate (TUMS) chewable tablet 500 mg (500 mg Oral Given 1/5/20 0059)   memantine (NAMENDA) tablet 10 mg (10 mg Oral Given 1/5/20 0859)   furosemide (LASIX) tablet 20 mg (20 mg Oral Given 1/5/20 0857)   sodium chloride 0 9 % bolus 1,000 mL (0 mL Intravenous Stopped 1/4/20 1939)   iohexol (OMNIPAQUE) 350 MG/ML injection (SINGLE-DOSE) 100 mL (100 mL Intravenous Given 1/4/20 1905)   levofloxacin (LEVAQUIN) IVPB (premix) 750 mg (750 mg Intravenous New Bag 1/4/20 2119)   furosemide (LASIX) injection 40 mg (40 mg Intravenous Given 1/4/20 2115)       Diagnostic Studies  Results Reviewed     Procedure Component Value Units Date/Time    Blood culture #1 [[id number]] Collected:  01/04/20 1756    Lab Status:  Preliminary result Specimen:  Blood from Arm, Left Updated:  01/04/20 2202     Blood Culture Received in Microbiology Lab  Culture in Progress  Blood culture #2 [[id number]] Collected:  01/04/20 1757    Lab Status:  Preliminary result Specimen:  Blood from Arm, Left Updated:  01/04/20 2202     Blood Culture Received in Microbiology Lab  Culture in Progress  Influenza A/B and RSV PCR [[id number]]  (Normal) Collected:  01/04/20 1958    Lab Status:  Final result Specimen:  Nasopharyngeal Swab Updated:  01/04/20 2050     INFLUENZA A PCR None Detected     INFLUENZA B PCR None Detected     RSV PCR None Detected    NT-BNP PRO [[id number]]  (Abnormal) Collected:  01/04/20 1758    Lab Status:  Final result Specimen:  Blood from Arm, Left Updated:  01/04/20 2034     NT-proBNP 2,385 pg/mL     Lactic acid, plasma x2 [[id number]]  (Normal) Collected:  01/04/20 1958    Lab Status:  Final result Specimen:  Blood from Hand, Right Updated:  01/04/20 2026     LACTIC ACID 1 0 mmol/L     Narrative:       Result may be elevated if tourniquet was used during collection      Troponin I [[id number]]  (Normal) Collected:  01/04/20 1759    Lab Status:  Final result Specimen:  Blood from Arm, Left Updated:  01/04/20 1841     Troponin I 0 02 ng/mL     APTT [[id number]]  (Normal) Collected:  01/04/20 1758    Lab Status:  Final result Specimen:  Blood from Arm, Left Updated:  01/04/20 1839     PTT 33 seconds     Lactic acid, plasma x2 [[id number]]  (Normal) Collected:  01/04/20 1759    Lab Status:  Final result Specimen:  Blood from Arm, Left Updated:  01/04/20 1839     LACTIC ACID 0 5 mmol/L     Narrative:       Result may be elevated if tourniquet was used during collection      Protime-INR [[id number]]  (Abnormal) Collected:  01/04/20 1758    Lab Status:  Final result Specimen:  Blood from Arm, Left Updated:  01/04/20 1839     Protime 14 6 seconds      INR 1 14    Comprehensive metabolic panel [[id number]]  (Abnormal) Collected:  01/04/20 1758    Lab Status:  Final result Specimen:  Blood from Arm, Left Updated:  01/04/20 1836     Sodium 143 mmol/L      Potassium 4 0 mmol/L      Chloride 106 mmol/L      CO2 32 mmol/L      ANION GAP 5 mmol/L      BUN 18 mg/dL      Creatinine 0 82 mg/dL      Glucose 80 mg/dL      Calcium 8 6 mg/dL      AST 97 U/L      ALT 46 U/L      Alkaline Phosphatase 172 U/L      Total Protein 6 6 g/dL      Albumin 3 3 g/dL      Total Bilirubin 0 67 mg/dL      eGFR 75 ml/min/1 73sq m     Narrative:       [place] guidelines for Chronic Kidney Disease (CKD):     Stage 1 with normal or high GFR (GFR > 90 mL/min/1 73 square meters)    Stage 2 Mild CKD (GFR = 60-89 mL/min/1 73 square meters)    Stage 3A Moderate CKD (GFR = 45-59 mL/min/1 73 square meters)    Stage 3B Moderate CKD (GFR = 30-44 mL/min/1 73 square meters)    Stage 4 Severe CKD (GFR = 15-29 mL/min/1 73 square meters)    Stage 5 End Stage CKD (GFR <15 mL/min/1 73 square meters)  Note: GFR calculation is accurate only with a steady state creatinine    UA w Reflex to Microscopic w Reflex to Culture [[id number]] Collected: 01/04/20 1803    Lab Status:  Final result Specimen:  Urine, Clean Catch Updated:  01/04/20 1820     Color, UA Yellow     Clarity, UA Clear     Specific Gravity, UA 1 010     pH, UA 6 5     Leukocytes, UA Negative     Nitrite, UA Negative     Protein, UA Negative mg/dl      Glucose, UA Negative mg/dl      Ketones, UA Negative mg/dl      Urobilinogen, UA 0 2 E U /dl      Bilirubin, UA Negative     Blood, UA Negative    CBC and differential [[id number]]  (Abnormal) Collected:  01/04/20 1757    Lab Status:  Final result Specimen:  Blood from Arm, Left Updated:  01/04/20 1817     WBC 5 12 Thousand/uL      RBC 3 39 Million/uL      Hemoglobin 11 4 g/dL      Hematocrit 35 2 %       fL      MCH 33 6 pg      MCHC 32 4 g/dL      RDW 15 8 %      MPV 10 9 fL      Platelets 342 Thousands/uL      nRBC 0 /100 WBCs      Neutrophils Relative 67 %      Immat GRANS % 0 %      Lymphocytes Relative 23 %      Monocytes Relative 7 %      Eosinophils Relative 2 %      Basophils Relative 1 %      Neutrophils Absolute 3 41 Thousands/µL      Immature Grans Absolute 0 01 Thousand/uL      Lymphocytes Absolute 1 18 Thousands/µL      Monocytes Absolute 0 37 Thousand/µL      Eosinophils Absolute 0 11 Thousand/µL      Basophils Absolute 0 04 Thousands/µL     Urine culture [[id number]] Collected:  01/04/20 1803    Lab Status: In process Specimen:  Urine, Clean Catch Updated:  01/04/20 1816    Fingerstick Glucose (POCT) [[id number]]  (Normal) Collected:  01/04/20 1709    Lab Status:  Final result Updated:  01/04/20 1713     POC Glucose 82 mg/dl                  CT head without contrast   Final Result by Deep Nelson MD (01/04 4603)      No acute intracranial abnormality  Chronic right basal ganglia lacunar infarct  Workstation performed: [id number]         CT cervical spine without contrast   Final Result by Deep Nelson MD (01/04 1919)      No cervical spine fracture or traumatic malalignment                     Workstation performed: [id number]         CT chest abdomen pelvis w contrast   Final Result by Mercy Dudley MD (01/04 1925)      No traumatic abnormality identified  Mild to moderate emphysema  Right middle and lower lobe opacities suggesting pneumonia  Cardiomegaly  Cholelithiasis  Ventral hernias as described  Workstation performed: [id number]                    Procedures  Procedures         ED Course                               MDM  Number of Diagnoses or Management Options  Ambulatory dysfunction:   Pneumonia:   Diagnosis management comments: Ddx: subdural hematoma, SAH, ACS, pneumonia, fracture chf, fracture, contusion, UTI ,bacteremia  The patient is a 43-year-old female who presented to the emergency department today with a concern of multiple falls and back pain who was requiring more assistance at home  Upon arrival the patient was found to be 89% on room air and was placed on 2 L of nasal cannula oxygen  Patient declined any history of oxygen requirement at home  Patient declined any shortness of breath or cough  Patient and family did note that she has a prescription of Lasix however only takes it when she notices lower leg edema which she has not  Laboratory findings indicated an elevated proBNP of 2000  CT trauma scan of head, cervical spine, chest abdomen pelvis revealed cardiomegaly and possible left lower lobe infiltrate  No traumatic findings were found  Patient interested in acute rehab    Admission to Dr Maycol Ochoa on med surg  Patient and family in agreement              Amount and/or Complexity of Data Reviewed  Clinical lab tests: ordered and reviewed  Tests in the radiology section of CPT®: ordered and reviewed  Obtain history from someone other than the patient: yes  Review and summarize past medical records: yes  Discuss the patient with other providers: yes          Disposition  Final diagnoses:   Pneumonia   Ambulatory dysfunction     Time reflects when diagnosis was documented in both MDM as applicable and the Disposition within this note     Time User Action Codes Description Comment    1/4/2020  7:45 PM Indy Sit Add [J18 9] Pneumonia     1/4/2020  7:45 PM Indy Sit Add [R26 2] Ambulatory dysfunction       ED Disposition     ED Disposition Condition Date/Time Comment    Admit Stable Sat Jan 4, 2020  7:54 PM Case was discussed with  Shirley and the patient's admission status was agreed to be Admission Status: inpatient status to the service of Dr Kat Better   Follow-up Information    None         Current Discharge Medication List      CONTINUE these medications which have NOT CHANGED    Details   allopurinol (ZYLOPRIM) 100 mg tablet Take 100 mg by mouth 2 (two) times a day      aspirin 81 mg chewable tablet Chew 81 mg daily      furosemide (LASIX) 20 mg tablet Take 20 mg by mouth 2 (two) times a day      ketorolac (TORADOL) 30 mg/mL injection Inject 60 mg into a muscle once      LORazepam (ATIVAN) 1 mg tablet Take 1 mg by mouth every 8 (eight) hours as needed for anxiety      memantine (NAMENDA TITRATION PACK) Take 28 mg by mouth see administration instructions Follow package directions  Mirabegron ER 50 MG TB24 Take 50 mg by mouth daily      oxyCODONE (OxyCONTIN) 10 mg 12 hr tablet Take 10 mg by mouth every 12 (twelve) hours      pantoprazole (PROTONIX) 40 mg tablet Take 40 mg by mouth daily      pregabalin (LYRICA) 75 mg capsule Take 75 mg by mouth 3 (three) times a day      rivastigmine (EXELON) 13 3 mg/24 hr TD 24 hr patch Place 1 patch on the skin daily      rosuvastatin (CRESTOR) 10 MG tablet Take 10 mg by mouth daily      sertraline (ZOLOFT) 100 mg tablet Take 150 mg by mouth daily      vitamin B-12 (VITAMIN B-12) 1,000 mcg tablet Take 1,000 mcg by mouth daily           No discharge procedures on file      ED Provider  Electronically Signed by           Lynda Hairston PA-C  01/05/20 1127

## 2020-01-05 NOTE — ASSESSMENT & PLAN NOTE
No results found for: HGBA1C    Recent Labs     01/04/20  1709   POCGLU 82       Blood Sugar Average: Last 72 hrs:  (P) 82     · Carb controlled diet  · Daily metabolic panel and trend glucose

## 2020-01-05 NOTE — ASSESSMENT & PLAN NOTE
· History of chronic back pain due to disc disease  · Takes oxycodone 10 mg p r n   At home  · Will use Adult Pain Management set

## 2020-01-05 NOTE — ASSESSMENT & PLAN NOTE
· Initial presentation was for frequent falls and increased weakness  · CT chest abdomen pelvis with contrast:  Right middle and lower lobe opacity suggesting pneumonia  · Initial O2 sat in the ER was 89% on room air    Respirations recorded at 26  · Flu swab:  Negative  · Afebrile and without leukocytosis  · Received Levaquin in the ER  · Patient do not have any respiratory symptoms  · Continue with the antibiotics for now  · Follow-up on the procalcitonin  · Deescalate antibiotics quickly

## 2020-01-06 ENCOUNTER — APPOINTMENT (INPATIENT)
Dept: NON INVASIVE DIAGNOSTICS | Facility: HOSPITAL | Age: 65
DRG: 194 | End: 2020-01-06
Payer: MEDICARE

## 2020-01-06 PROBLEM — R09.02 HYPOXIA: Status: ACTIVE | Noted: 2020-01-06

## 2020-01-06 LAB
ANION GAP SERPL CALCULATED.3IONS-SCNC: 8 MMOL/L (ref 4–13)
ATRIAL RATE: 73 BPM
BACTERIA UR CULT: NORMAL
BUN SERPL-MCNC: 19 MG/DL (ref 5–25)
CALCIUM SERPL-MCNC: 8.3 MG/DL (ref 8.3–10.1)
CHLORIDE SERPL-SCNC: 101 MMOL/L (ref 100–108)
CHOLEST SERPL-MCNC: 115 MG/DL (ref 50–200)
CO2 SERPL-SCNC: 31 MMOL/L (ref 21–32)
CREAT SERPL-MCNC: 0.87 MG/DL (ref 0.6–1.3)
ERYTHROCYTE [DISTWIDTH] IN BLOOD BY AUTOMATED COUNT: 15.8 % (ref 11.6–15.1)
EST. AVERAGE GLUCOSE BLD GHB EST-MCNC: 103 MG/DL
GFR SERPL CREATININE-BSD FRML MDRD: 70 ML/MIN/1.73SQ M
GLUCOSE SERPL-MCNC: 94 MG/DL (ref 65–140)
HBA1C MFR BLD: 5.2 % (ref 4.2–6.3)
HCT VFR BLD AUTO: 33.6 % (ref 34.8–46.1)
HDLC SERPL-MCNC: 24 MG/DL
HGB BLD-MCNC: 10.9 G/DL (ref 11.5–15.4)
LDLC SERPL CALC-MCNC: 70 MG/DL (ref 0–100)
MCH RBC QN AUTO: 33.2 PG (ref 26.8–34.3)
MCHC RBC AUTO-ENTMCNC: 32.4 G/DL (ref 31.4–37.4)
MCV RBC AUTO: 102 FL (ref 82–98)
NONHDLC SERPL-MCNC: 91 MG/DL
P AXIS: 61 DEGREES
PLATELET # BLD AUTO: 95 THOUSANDS/UL (ref 149–390)
PMV BLD AUTO: 11.7 FL (ref 8.9–12.7)
POTASSIUM SERPL-SCNC: 3.5 MMOL/L (ref 3.5–5.3)
PR INTERVAL: 178 MS
PROCALCITONIN SERPL-MCNC: 0.29 NG/ML
QRS AXIS: 245 DEGREES
QRSD INTERVAL: 174 MS
QT INTERVAL: 498 MS
QTC INTERVAL: 548 MS
RBC # BLD AUTO: 3.28 MILLION/UL (ref 3.81–5.12)
SODIUM SERPL-SCNC: 140 MMOL/L (ref 136–145)
T WAVE AXIS: 0 DEGREES
TRIGL SERPL-MCNC: 104 MG/DL
TSH SERPL DL<=0.05 MIU/L-ACNC: 2.46 UIU/ML (ref 0.36–3.74)
VENTRICULAR RATE: 73 BPM
WBC # BLD AUTO: 5.53 THOUSAND/UL (ref 4.31–10.16)

## 2020-01-06 PROCEDURE — 85027 COMPLETE CBC AUTOMATED: CPT | Performed by: FAMILY MEDICINE

## 2020-01-06 PROCEDURE — 93321 DOPPLER ECHO F-UP/LMTD STD: CPT | Performed by: INTERNAL MEDICINE

## 2020-01-06 PROCEDURE — 80048 BASIC METABOLIC PNL TOTAL CA: CPT | Performed by: FAMILY MEDICINE

## 2020-01-06 PROCEDURE — 97167 OT EVAL HIGH COMPLEX 60 MIN: CPT

## 2020-01-06 PROCEDURE — 83036 HEMOGLOBIN GLYCOSYLATED A1C: CPT | Performed by: FAMILY MEDICINE

## 2020-01-06 PROCEDURE — 97163 PT EVAL HIGH COMPLEX 45 MIN: CPT

## 2020-01-06 PROCEDURE — 93325 DOPPLER ECHO COLOR FLOW MAPG: CPT | Performed by: INTERNAL MEDICINE

## 2020-01-06 PROCEDURE — 93308 TTE F-UP OR LMTD: CPT | Performed by: INTERNAL MEDICINE

## 2020-01-06 PROCEDURE — 93010 ELECTROCARDIOGRAM REPORT: CPT | Performed by: INTERNAL MEDICINE

## 2020-01-06 PROCEDURE — 84145 PROCALCITONIN (PCT): CPT | Performed by: FAMILY MEDICINE

## 2020-01-06 PROCEDURE — 80061 LIPID PANEL: CPT | Performed by: FAMILY MEDICINE

## 2020-01-06 PROCEDURE — 93308 TTE F-UP OR LMTD: CPT

## 2020-01-06 PROCEDURE — 84443 ASSAY THYROID STIM HORMONE: CPT | Performed by: FAMILY MEDICINE

## 2020-01-06 PROCEDURE — 99222 1ST HOSP IP/OBS MODERATE 55: CPT | Performed by: ORTHOPAEDIC SURGERY

## 2020-01-06 RX ORDER — METHYLPREDNISOLONE SODIUM SUCCINATE 40 MG/ML
40 INJECTION, POWDER, LYOPHILIZED, FOR SOLUTION INTRAMUSCULAR; INTRAVENOUS EVERY 12 HOURS SCHEDULED
Status: DISCONTINUED | OUTPATIENT
Start: 2020-01-06 | End: 2020-01-08 | Stop reason: HOSPADM

## 2020-01-06 RX ORDER — FLUCONAZOLE 100 MG/1
150 TABLET ORAL ONCE
Status: COMPLETED | OUTPATIENT
Start: 2020-01-06 | End: 2020-01-06

## 2020-01-06 RX ADMIN — PREGABALIN 150 MG: 75 CAPSULE ORAL at 17:21

## 2020-01-06 RX ADMIN — FUROSEMIDE 20 MG: 20 TABLET ORAL at 17:18

## 2020-01-06 RX ADMIN — FUROSEMIDE 20 MG: 20 TABLET ORAL at 08:49

## 2020-01-06 RX ADMIN — SERTRALINE HYDROCHLORIDE 150 MG: 100 TABLET ORAL at 08:49

## 2020-01-06 RX ADMIN — AZITHROMYCIN 500 MG: 250 TABLET, FILM COATED ORAL at 22:10

## 2020-01-06 RX ADMIN — ALLOPURINOL 100 MG: 100 TABLET ORAL at 08:52

## 2020-01-06 RX ADMIN — CYANOCOBALAMIN TAB 500 MCG 1000 MCG: 500 TAB at 08:49

## 2020-01-06 RX ADMIN — ASPIRIN 81 MG 81 MG: 81 TABLET ORAL at 08:49

## 2020-01-06 RX ADMIN — ENOXAPARIN SODIUM 40 MG: 40 INJECTION SUBCUTANEOUS at 08:49

## 2020-01-06 RX ADMIN — MEMANTINE 10 MG: 10 TABLET ORAL at 17:19

## 2020-01-06 RX ADMIN — PREGABALIN 150 MG: 75 CAPSULE ORAL at 08:49

## 2020-01-06 RX ADMIN — PANTOPRAZOLE SODIUM 40 MG: 40 TABLET, DELAYED RELEASE ORAL at 08:49

## 2020-01-06 RX ADMIN — CEFTRIAXONE 1000 MG: 1 INJECTION, SOLUTION INTRAVENOUS at 22:10

## 2020-01-06 RX ADMIN — OXYBUTYNIN CHLORIDE 10 MG: 5 TABLET, EXTENDED RELEASE ORAL at 08:49

## 2020-01-06 RX ADMIN — PRAVASTATIN SODIUM 80 MG: 80 TABLET ORAL at 17:18

## 2020-01-06 RX ADMIN — RIVASTIGMINE 13.3 MG: 13.3 PATCH, EXTENDED RELEASE TRANSDERMAL at 08:52

## 2020-01-06 RX ADMIN — MEMANTINE 10 MG: 10 TABLET ORAL at 08:49

## 2020-01-06 RX ADMIN — FLUCONAZOLE 150 MG: 100 TABLET ORAL at 17:19

## 2020-01-06 RX ADMIN — PERFLUTREN 0.8 ML/MIN: 6.52 INJECTION, SUSPENSION INTRAVENOUS at 14:22

## 2020-01-06 RX ADMIN — METHYLPREDNISOLONE SODIUM SUCCINATE 40 MG: 40 INJECTION, POWDER, FOR SOLUTION INTRAMUSCULAR; INTRAVENOUS at 23:43

## 2020-01-06 RX ADMIN — ALLOPURINOL 100 MG: 100 TABLET ORAL at 17:19

## 2020-01-06 RX ADMIN — OXYCODONE HYDROCHLORIDE 10 MG: 10 TABLET ORAL at 17:21

## 2020-01-06 NOTE — PHYSICAL THERAPY NOTE
PHYSICAL THERAPY EVALUATION  NAME:  Augusto Bray  DATE: 01/06/20    AGE:   71 y o  Mrn:   [id number]  ADMIT DX:  Back pain [M54 9]  Pneumonia [J18 9]  Ambulatory dysfunction [R26 2]    Past Medical History:   Diagnosis Date    Cardiac disease     Spinal stenosis      Length Of Stay: 2  Performed at least 2 patient identifiers during session: Name and Birthday  PHYSICAL THERAPY EVALUATION :        01/06/20 0830   Note Type   Note type Eval only   Pain Assessment   Pain Assessment 0-10   Pain Score 7   Pain Type Chronic pain   Pain Location Back  (low)   Home Living   Type of Home House   Home Layout Two level;Bed/bath upstairs  (stair glide to 2nd floor  3 LINDSAY without rail)   Bathroom Shower/Tub Walk-in shower   Bathroom Equipment Shower chair;Grab bars in [id number] [employer] bars   Additional Comments Pt reports has assistance from "Kimberlyn" to get into the shower  Otherwise being (I) with RW PTA and able to get washed and dressed without assistance "normally"  Prior Function   Level of [place] Independent with ADLs and functional mobility   Lives With Daughter;Friend(s)  Chaparro Mahajan (dtr) and girlfriend (Kimberlyn))   Receives Help From Family;Friend(s)   ADL Assistance Needs assistance  (at times)   IADLs Needs assistance   Falls in the last 6 months 5 to 10   Comments Questionable PLOF with mobility with inconsistent report  Reports she was (I) PTA with RW, only requiring assistance with getting in and out of shower, but can wash and dress herself otherwise as per pt  Restrictions/Precautions   Other Precautions Chair Alarm; Bed Alarm;Aspiration;Cognitive;Multiple lines; Fall Risk;O2;Pain   General   Additional Pertinent History R shoulder imaging (-) for acute fx, chronic fx with hardware   Cognition   Orientation Level Oriented to person;Oriented to place;Oriented to time   Following Commands Follows one step commands with increased time or repetition   RLE Assessment   RLE Assessment [facility] LLE Assessment   LLE Assessment WFL   Light Touch   RLE Light Touch Grossly intact   LLE Light Touch Grossly intact   Bed Mobility   Supine to Sit 3  Moderate assistance   Additional items Assist x 2; Increased time required;Verbal cues  (VCs for technique, MCs for trunk and hip management)   Additional Comments HOB flat without bedrail  completed bed mobility with modAx2 with increased time and effort   Transfers   Sit to Stand 2  Maximal assistance  (maxAx1 from EOB, modAx1 from chair)   Additional items Assist x 1; Increased time required;Verbal cues  (VCs for hand placement, ant wt shift)   Stand to Sit 3  Moderate assistance   Additional items Assist x 1;Verbal cues  (VCs for hand placement, controlled descent)   Stand pivot 2  Maximal assistance   Additional items Assist x 1; Increased time required;Verbal cues  (VCs and manual cues for wt shifting, management of RW)   Additional Comments RW for transfers  patient with increased posterior lean sitting EOB requiring verbal cues for atnerior wt shift and upright posture and mod to min VCs and MCs to sit EOB  Verbal cues for hand placement on RW to facilitate anterior wt shift  increased time and effort to achieve standing  increased lateral lean to left upon standing  Ambulation/Elevation   Gait pattern Excessively slow; Short stride  (increased lat lean to L, dec step length and foot clearance)   Gait Assistance 2  Maximal assist   Additional items Assist x 1;Verbal cues  (VCs for sequence and step length, MCs for wt shifting)   Assistive Device Rolling walker   Distance 3' with RW and maxAx1 and chair follow   Balance   Static Sitting Poor   Dynamic Sitting Poor   Static Standing Poor   Dynamic Standing Poor -   Ambulatory Poor -   Activity Tolerance   Activity Tolerance Patient limited by fatigue;Patient limited by pain   Medical Staff Made Aware Flaco PEREIRA Push   Nurse Made Aware Jackie BOBBY   Assessment   Prognosis Good   Problem List Decreased strength;Decreased endurance; Impaired balance;Decreased mobility; Decreased cognition; Impaired judgement;Decreased safety awareness;Pain   Goals   Patient Goals "Go home "   STG Expiration Date 01/16/20   PT Treatment Day 0   Plan   Treatment/Interventions Functional transfer training;LE strengthening/ROM; Therapeutic exercise; Endurance training;Elevations;Cognitive reorientation;Patient/family training;Equipment eval/education; Bed mobility;Gait training; Compensatory technique education;Spoke to nursing;OT;Spoke to case management   PT Frequency Other (Comment)  (3-5x/week)   Recommendation   Recommendation Short-term skilled PT   Equipment Recommended Walker   PT - OK to Discharge   (when medically cleared)   Additional Comments [facility] 6 clicks 98/39     (Please find full objective findings from PT assessment regarding body systems outlined above)  Assessment: Pt is a 71 y o  female seen for PT evaluation s/p admission to [address] [place] [address] on 1/4/2020 with Frequent falls  Pt with chronic R basal ganglia lacunar infarct per imaging  Chest CT indicating mild to moderate emphysema, suggest PNA  Chronic R shoulder fx with hardware, no acute fx  Order placed for PT services    Upon evaluation: Pt is presenting with impaired functional mobility due to pain, decreased strength, decreased endurance, impaired balance, gait deviations, impaired cognition, decreased safety awareness, impaired judgment, fall risk and impaired skin integrity requiring moderate assistance of two for bed mobility, moderate to maximal assistance for transfers and maximal assistance for ambulation with RW  Pt's clinical presentation is currently unpredictable given the functional mobility deficits above, especially weakness, decreased skin integrity, decreased endurance, gait deviations, pain, decreased activity tolerance, decreased functional mobility tolerance, decreased safety awareness, impaired judgement and decreased cognition, coupled with fall risks as indicated by AM-PAC 6-Clicks: 60/29 as well as hx of falls, impaired balance, impaired judgement, decreased safety awareness, decreased balance and decreased cognition and combined with medical complications of pain impacting overall mobility status, abnormal H&H, new onset O2 use, need for input for mobility technique/safety and elevated BNP  Pt's PMHx and comorbidities that may affect physical performance and progress include: COPD, CVA, Dementia, obesity, limited cognition and spinal stenosis, thrombocytopenia  Personal factors affecting pt at time of IE include: inaccessible home environment, step(s) to enter environment, inability to perform ADLs, inability to navigate level surfaces without external assistance, inability to ambulate household distances and recent fall(s)/fall history  Pt will benefit from continued skilled PT services to address deficits as defined above and to maximize level of functional mobility to facilitate return toward PLOF and improved QOL  From PT/mobility standpoint, recommendation at time of d/c would be Short term rehab pending progress in order to reduce fall risk and maximize pt's functional independence and consistency with mobility in order to facilitate return to PLOF  Recommend ther ex next 1-2 sessions  Goals: Pt will: Perform bed mobility tasks with minAx1 to reposition in bed and prepare for transfers  Pt will perform transfers with minAx1 to decrease burden of care, decrease risk for falls and improve ease of transfers and prepare for ambulation  Pt will ambulate with RW for >/= 25' with  minAx1  to decrease burden of care, decrease risk for falls, improve activity tolerance and improve gait quality and to access home environment  Pt will complete >/= 3 steps with with unilateral handrail with modAx1 to decrease burden of care, return to home with LINDSAY and improve activity tolerance          Srikanth Bingham, PT,DPT

## 2020-01-06 NOTE — OCCUPATIONAL THERAPY NOTE
[address]  Evaluation     Patient Name: Mary Sánchez  [id number] Date: 1/6/2020  Problem List  Principal Problem:    Frequent falls  Active Problems: Thrombocytopenia (HCC)    Chronic back pain    Pneumonia    Dementia ([address] )    Diabetes mellitus type 2, diet-controlled (HCC)    Elevated brain natriuretic peptide (BNP) level    COPD (chronic obstructive pulmonary disease) ([facility])    Past Medical History  Past Medical History:   Diagnosis Date    Cardiac disease     Spinal stenosis      Past Surgical History  No past surgical history on file  01/06/20 0850   Note Type   Note type Eval only   Restrictions/Precautions   Other Precautions Bed Alarm; Chair Alarm; Fall Risk;Pain;Multiple lines;Cognitive   Pain Assessment   Pain Assessment 0-10   Pain Score 7   Pain Type Chronic pain   Pain Location Back   Pain Orientation Lower   Home Living   Type of Home House   Home Layout Two level;Bed/bath upstairs  (3 LINDSAY with HR  stair glide to 2nd floor  )   Bathroom Shower/Tub Walk-in shower   Bathroom Equipment Shower chair;Grab bars in shower   Home Equipment Grab bars   Additional Comments Pt report living in a 2 story home with 3 LINDSAY  Pt ambulating with RW lives with her girlfriend and her daughter  Prior Function   Level of [place] Independent with ADLs and functional mobility   Lives With Daughter;Friend(s)  Nya Camilo (dtr) and girlfriend (Kimberlyn))   Receives Help From Family;Friend(s)   ADL Assistance Needs assistance  (at times)   IADLs Needs assistance   Falls in the last 6 months 5 to 10   Comments Pt reporting she has assistance with LB ADL tasks and showering  Pt ambulates and completes toileting @ Mod I with RW     Lifestyle   Autonomy Pt reports requiring assistance for ADL and IADL tasks   Reciprocal Relationships Pt lives with Adams Benz (girlfriend vs live-in caregiver) and her daughter    Service to Others Pt is not working   Intrinsic Gratification Pt enjoys spending time with Katir   ADL Where Assessed Edge of bed   UB Dressing Assistance 4  Minimal Assistance   LB Dressing Assistance 2  Maximal Assistance   Bed Mobility   Supine to Sit 3  Moderate assistance   Additional items Assist x 2; Increased time required; Impulsive;Verbal cues; Bedrails   Additional Comments HOB flat, Mod x's 2, significant posterior lean   Transfers   Sit to Stand 2  Maximal assistance   Additional items Assist x 1; Increased time required;Verbal cues   Stand to Sit 3  Moderate assistance   Additional items Assist x 1   Stand pivot 2  Maximal assistance   Additional items Assist x 1; Increased time required;Verbal cues   Additional Comments RW for transfers  patient with increased posterior lean sitting EOB requiring verbal cues for atnerior wt shift and upright posture and mod to min VCs and MCs to sit EOB  Verbal cues for hand placement on RW to facilitate anterior wt shift  increased time and effort to achieve standing  increased lateral lean to left upon standing  Balance   Static Sitting Poor   Dynamic Sitting Poor   Static Standing Poor   Dynamic Standing Poor -   Activity Tolerance   Activity Tolerance Patient limited by fatigue;Patient limited by pain   Medical Staff Made Aware Spoke with Tristen OLIVER with [facility]   RUE Assessment   RUE Assessment   (limited B shoulder ROM d/t previous injury/surgery)   LUE Assessment   LUE Assessment   (limited B shoulder ROM d/t previous injury/surgery)   Hand Function   Gross Motor Coordination Functional   Fine Motor Coordination Functional   Cognition   Overall Cognitive Status Impaired   Arousal/Participation Alert; Responsive; Cooperative   Attention Attends with cues to redirect   Orientation Level Oriented to person;Oriented to place;Oriented to time   Following Commands Follows one step commands with increased time or repetition   Assessment   Limitation Decreased ADL status; Decreased UE strength;Decreased UE ROM; Decreased Safe judgement during ADL;Decreased cognition;Decreased endurance;Decreased high-level ADLs   Prognosis Good   Assessment Pt is a 70 y/o F admitted to [address] [place] [address] 1/4/2020 d/t experiencing frequent falls and progressive weakness over the past few weeks  Dx: pneumonia  Pt with significant PMHx impacting performance during functional tasks including: frequent falls, COPD, DM II, dementia, chronic back pain  Pt reports living in a 2 story home with 3 LINDSAY with HR  stair glide to 2nd floor where her bed/bath are  Pt completing ADL and IADL tasks with assistance from "Kassy Randhawa and my daughter"  Pt ambulates in home with use of RW  On evaluation, Pt completing supine>sit @ Mod x's 2 with extended time, vc'ing and use of bed rails  Pt with significant posterior lean which was inhibiting Pt from performing at highest level of independence  Unsupported sitting balance @ P  Pt requiring Max A x's 1 for STS from EOB>RW  Mod-Max A for SPT to recliner and Mod A x's 1 for STS from recliner  Pt requiring Max A for LB dressing and Min A for UB dressing  Pt's BUE are functional for self-feeding and light functional tasks  Pt unable to actively move B arms above 90* d/t past surgeries and injury  Pt reports living with Kassy Randhawa "my girlfriend" and her daughter  Pt with some inconsistent reporting, uncertain of accuracy of PLOF and home set-up  Pt presents with decrease activity tolerance, decrease standing balance, decrease sitting balance, decrease performance during ADL tasks, decrease cognition, decrease safety awareness , increase impulsiveness, decrease BUE ROM, decrease UB MS, increased pain, decrease generalized strength, decrease activity engagement, decrease performance during functional transfers and decrease GM control  Pt would benefit from continued acute OT services to address deficits as well as post acute rehab upon d/c from [address] [place] [address]   Treatment Interventions ADL retraining;Functional transfer training;UE strengthening/ROM; Endurance training;Cognitive reorientation;Patient/family training;Equipment evaluation/education; Compensatory technique education;Continued evaluation; Energy conservation; Activityengagement   Goal Expiration Date 01/16/20   OT Frequency 3-5x/wk   Recommendation   Recommendation   (post acute rehab)   OT Discharge Recommendation   (post acute rehab)   Barthel Index   Feeding 10   Bathing 0   Grooming Score 0   Dressing Score 5   Bladder Score 5   Bowels Score 5   Toilet Use Score 5   Transfers (Bed/Chair) Score 5   Mobility (Level Surface) Score 0   Stairs Score 0   Barthel Index Score 35     Pt goals to be met by 1/16/2020    1  Pt will demonstrate ability to complete grooming/hygiene tasks @ Mod I after set-up  2  Pt will demonstrate ability to complete supine<>sit @ Min A in order to increase safety and independence during ADL tasks  3  Pt will demonstrate ability to complete UB ADLs including washing/dressing @ Mod I after set-up in order to increase performance and participation during meaningful tasks  4  Pt will demonstrate ability to complete LB dressing @ Mod A in order to increase safety and independence during meaningful tasks  5  Pt will demonstrate ability to complete toileting tasks including CM and pericare @ Min A in order to increase safety and independence during meaningful tasks  6  Pt will demonstrate ability to complete EOB, chair, toilet/commode transfers @ S in order to increase performance and participation during functional tasks  7  Pt will demonstrate ability to stand for 10 minutes while maintaining G balance with use of RW for UB support PRN  8  Pt will demonstrate ability to tolerate 30-35 minute OT session with no vc'ing for deep breathing or use of energy conservation techniques in order to increase activity tolerance during functional tasks     9  Pt will demonstrate Good carryover of use of energy conservation/compensatory strategies during ADLs and functional tasks in order to increase safety and reduce risk for falls  10  Pt will demonstrate Good attention and participation in continued evaluation of functional ambulation house hold distances in order to assist with safe d/c planning  11  Pt will attend to continued cognitive assessments 100% of the time in order to provide most appropriate d/c recommendations  12  Pt will follow 100% simple 2-step commands and be A&O x4 consistently with environmental cues to increase participation in functional activities  13  Pt will identify 3 areas of interest/hobbies and 1 intervention on how to incorporate into daily life in order to increase interaction with environment and peers as well as increase participation in meaningful tasks  14  Pt will demonstrate 100% carryover of BUE HEP in order to increase BUE MS and increase performance during functional tasks upon d/c home      Sandy Moeller OTR/L

## 2020-01-06 NOTE — SOCIAL WORK
STR has been recommended by PT/OT, CM gave pt a list of STR in a 20 miles radius from pts home  Pt asked to be given some time to review  Cm will check back with pt in the afternoon    As per pts request, a referrals have been placed to [address] [place] [address] post acute care recommendation was made by your care team for STR  Discussed Freedom of Choice with patient  List of facilities given to patient via in person  patient aware the list is custom filtered for them by zip code location and that [facility] post acute providers are designated

## 2020-01-06 NOTE — SOCIAL WORK
CM met with the patient at bedside to review the CM role and discuss possible dc needs  At time of interview pt is AAOx3, pt live with her daughter and friend in a 2 story home with 3 LINDSAY  Pt needing assistance with ADL's  Pt sts she has a walk in shower/tub, needs assistance to get into shower and then can perform ADL's  Pt sts her bathroom and bedroom are on second floor, she has a chair lift  Pt sts there are grab bars in the shower and near the toilet  Pt has DME of a walker and a cane  Pt is ambulatory without assistance  Pt does not drive, her friend and daughter are available for transportation needs  Pt denies any history of drug/etoh abuse, mental illness or inpatient psych admissions  Pt sts she has been to [facility] in the past but does not wish to return  Pt sts she has utilized [facility] in the past   Pt does not have a POA/LW  Preferred Pharmacy: [facility]   Contact: Madhu, friend, [phone]  PCP: Dr Hussain Paz    CM reviewed d/c planning process including the following: identifying help at home, patient preference for d/c planning needs, availability of treatment team to discuss questions or concerns patient and/or family may have regarding understanding medications and recognizing signs and symptoms once discharged  CM also encouraged patient to follow up with all recommended appointments after discharge  Patient advised of importance for patient and family to participate in managing patients medical well being

## 2020-01-06 NOTE — PLAN OF CARE
Problem: OCCUPATIONAL THERAPY ADULT  Goal: Performs self-care activities at highest level of function for planned discharge setting  See evaluation for individualized goals  Description  Treatment Interventions: ADL retraining, Functional transfer training, UE strengthening/ROM, Endurance training, Cognitive reorientation, Patient/family training, Equipment evaluation/education, Compensatory technique education, Continued evaluation, Energy conservation, Activityengagement          See flowsheet documentation for full assessment, interventions and recommendations  Note:   Limitation: Decreased ADL status, Decreased UE strength, Decreased UE ROM, Decreased Safe judgement during ADL, Decreased cognition, Decreased endurance, Decreased high-level ADLs  Prognosis: Good  Assessment: Pt is a 70 y/o F admitted to [address] [place] [address] 1/4/2020 d/t experiencing frequent falls and progressive weakness over the past few weeks  Dx: pneumonia  Pt with significant PMHx impacting performance during functional tasks including: frequent falls, COPD, DM II, dementia, chronic back pain  Pt reports living in a 2 story home with 3 LINDSAY with HR  stair glide to 2nd floor where her bed/bath are  Pt completing ADL and IADL tasks with assistance from "Suzanne Morrison and my daughter"  Pt ambulates in home with use of RW  On evaluation, Pt completing supine>sit @ Mod x's 2 with extended time, vc'ing and use of bed rails  Pt with significant posterior lean which was inhibiting Pt from performing at highest level of independence  Unsupported sitting balance @ P  Pt requiring Max A x's 1 for STS from EOB>RW  Mod-Max A for SPT to recliner and Mod A x's 1 for STS from recliner  Pt requiring Max A for LB dressing and Min A for UB dressing  Pt's BUE are functional for self-feeding and light functional tasks  Pt unable to actively move B arms above 90* d/t past surgeries and injury  Pt reports living with Suzanne Morrison "my girlfriend" and her daughter   Pt with some inconsistent reporting, uncertain of accuracy of PLOF and home set-up  Pt presents with decrease activity tolerance, decrease standing balance, decrease sitting balance, decrease performance during ADL tasks, decrease cognition, decrease safety awareness , increase impulsiveness, decrease BUE ROM, decrease UB MS, increased pain, decrease generalized strength, decrease activity engagement, decrease performance during functional transfers and decrease GM control   Pt would benefit from continued acute OT services to address deficits as well as post acute rehab upon d/c from [address] [place] [address]  Recommendation: (post acute rehab)  OT Discharge Recommendation: (post acute rehab)

## 2020-01-06 NOTE — CONSULTS
Consultation - Orthopedics   Igor Rea 71 y o  female MRN: [id number]  Unit/Bed#: -01 Encounter: [id number]      Assessment/Plan     Assessment:  Chronic right shoulder pain and instability status post ORIF of proximal right humerus fracture several years ago  Plan:  No intervention specifically regarding her shoulder is needed  I would be happy to see the patient again if it is felt to be necessary  History of Present Illness   Physician Requesting Consult: Ky Foley MD  Reason for Consult / Principal Problem:  Right shoulder pain  HPI: Igor Rea is a 71y o  year old female who presents with admission for multiple medical problems including increasing difficulty with walking, weakness and nonproductive cough  I was asked to see this patient regarding her right shoulder with a concern that there may be a dislocation  She underwent surgical fixation of a right proximal humeral fracture with extension into the right humeral shaft several years ago  She has had intermittent problems with the shoulder over the years and admits that there really is no change in the shoulder symptoms as a result of these recent falls  She intermittently feels as if the shoulder slides in and out of socket but this has been going on for several years  She denies any change in that symptomatology  Consults    Review of Systems:  Patient is 10 organ system review was negative excluding the history of chief complaint an as reviewed in the patient's admission note  Historical Information   Past Medical History:   Diagnosis Date    Cardiac disease     Spinal stenosis      No past surgical history on file    Social History   Social History     Substance and Sexual Activity   Alcohol Use Never    Frequency: Never     Social History     Substance and Sexual Activity   Drug Use Never     Social History     Tobacco Use   Smoking Status Unknown If Ever Smoked   Smokeless Tobacco Never Used     Family History: non-contributory    Meds/Allergies   all current active meds have been reviewed  No Known Allergies    Objective   Vitals: Blood pressure 115/61, pulse 71, temperature 99 6 °F (37 6 °C), resp  rate 21, height 5' 3" (1 6 m), weight 104 kg (229 lb 15 oz), SpO2 91 %  ,Body mass index is 40 73 kg/m²  Intake/Output Summary (Last 24 hours) at 1/6/2020 1151  Last data filed at 1/6/2020 0830  Gross per 24 hour   Intake 290 ml   Output 175 ml   Net 115 ml     I/O last 24 hours: In: 290 [P O :240; IV Piggyback:50]  Out: 175 [Urine:175]    Invasive Devices     Peripheral Intravenous Line            Peripheral IV 01/04/20 Left Antecubital 1 day                Physical Exam:  Sadaf Paulino is alert, seems oriented and in notice significant distress sitting at bedside  HEENT exam is benign  Heart regular  Lungs clear  Abdomen soft, nontender  Motor and sensory exam is grossly intact although generalized weakness is noted  Ortho Exam:  The right upper extremity exam demonstrates no pain during shoulder, elbow, forearm wrist or hand range of motion  She does have some tenderness at the 1st [facility] joint of the right hand  She complained of popping or cracking at the right shoulder during motion but denies pain  There does appear to be some excessive mobility of the right shoulder joint but this did not trigger any complaints of pain  There are well-healed incisions noted consistent with her past surgical history  Lab Results:   CBC:   Lab Results   Component Value Date    WBC 5 53 01/06/2020    HGB 10 9 (L) 01/06/2020    HCT 33 6 (L) 01/06/2020     (H) 01/06/2020    PLT 95 (L) 01/06/2020    MCH 33 2 01/06/2020    MCHC 32 4 01/06/2020    RDW 15 8 (H) 01/06/2020    MPV 11 7 01/06/2020     CMP:   Lab Results   Component Value Date    SODIUM 140 01/06/2020     01/06/2020    CO2 31 01/06/2020    BUN 19 01/06/2020    CREATININE 0 87 01/06/2020    CALCIUM 8 3 01/06/2020    EGFR 70 01/06/2020     Imaging Studies:  The patient's right shoulder x-rays were reviewed  These demonstrate a healed fracture with a proximal humeral plate extending to the distal 3rd of the humeral shaft  There is evidence of slight subluxation of the humeral head  EKG, Pathology, and Other Studies: I have personally reviewed pertinent reports

## 2020-01-06 NOTE — PLAN OF CARE
Problem: Potential for Falls  Goal: Patient will remain free of falls  Description  INTERVENTIONS:  - Assess patient frequently for physical needs  -  Identify cognitive and physical deficits and behaviors that affect risk of falls    -  [place] fall precautions as indicated by assessment   - Educate patient/family on patient safety including physical limitations  - Instruct patient to call for assistance with activity based on assessment  - Modify environment to reduce risk of injury  - Consider OT/PT consult to assist with strengthening/mobility  Outcome: Progressing     Problem: PAIN - ADULT  Goal: Verbalizes/displays adequate comfort level or baseline comfort level  Description  Interventions:  - Encourage patient to monitor pain and request assistance  - Assess pain using appropriate pain scale, 0-10  - Administer analgesics based on type and severity of pain and evaluate response  - Implement non-pharmacological measures as appropriate and evaluate response  - Consider cultural and social influences on pain and pain management  - Notify physician/advanced practitioner if interventions unsuccessful or patient reports new pain   Outcome: Progressing     Problem: INFECTION - ADULT  Goal: Absence or prevention of progression during hospitalization  Description  INTERVENTIONS:  - Assess and monitor for signs and symptoms of infection  - Monitor lab/diagnostic results  - Monitor all insertion sites, i e  indwelling lines, tubes, and drains  - Monitor endotracheal if appropriate and nasal secretions for changes in amount and color  - [place] appropriate cooling/warming therapies per order  - Administer medications as ordered  - Instruct and encourage patient and family to use good hand hygiene technique  - Identify and instruct in appropriate isolation precautions for identified infection/condition  Outcome: Progressing  Goal: Absence of fever/infection during neutropenic period  Description  INTERVENTIONS:  - Monitor WBC    Outcome: Progressing     Problem: SAFETY ADULT  Goal: Maintain or return to baseline ADL function  Description  INTERVENTIONS:  -  Assess patient's ability to carry out ADLs; assess patient's baseline for ADL function and identify physical deficits which impact ability to perform ADLs (bathing, care of mouth/teeth, toileting, grooming, dressing, etc )  - Assess/evaluate cause of self-care deficits   - Assess range of motion  - Assess patient's mobility; develop plan if impaired  - Assess patient's need for assistive devices and provide as appropriate  - Encourage maximum independence but intervene and supervise when necessary  - Involve family in performance of ADLs  - Assess for home care needs following discharge   - Consider OT consult to assist with ADL evaluation and planning for discharge  - Provide patient education as appropriate  Outcome: Progressing  Goal: Maintain or return mobility status to optimal level  Description  INTERVENTIONS:  - Assess patient's baseline mobility status (ambulation, transfers, stairs, etc )    - Identify cognitive and physical deficits and behaviors that affect mobility  - Identify mobility aids required to assist with transfers and/or ambulation (gait belt, sit-to-stand, lift, walker, cane, etc )  - [place] fall precautions as indicated by assessment  - Record patient progress and toleration of activity level on Mobility SBAR; progress patient to next Phase/Stage  - Instruct patient to call for assistance with activity based on assessment  - Consider rehabilitation consult to assist with strengthening/weightbearing, etc   Outcome: Progressing     Problem: DISCHARGE PLANNING  Goal: Discharge to home or other facility with appropriate resources  Description  INTERVENTIONS:  - Identify barriers to discharge w/patient and caregiver  - Arrange for needed discharge resources and transportation as appropriate  - Identify discharge learning needs (meds, wound care, etc )  - Arrange for interpretive services to assist at discharge as needed  - Refer to Case Management Department for coordinating discharge planning if the patient needs post-hospital services based on physician/advanced practitioner order or complex needs related to functional status, cognitive ability, or social support system  Outcome: Progressing     Problem: Knowledge Deficit  Goal: Patient/family/caregiver demonstrates understanding of disease process, treatment plan, medications, and discharge instructions  Description  Complete learning assessment and assess knowledge base    Interventions:  - Provide teaching at level of understanding  - Provide teaching via preferred learning methods  Outcome: Progressing     Problem: RESPIRATORY - ADULT  Goal: Achieves optimal ventilation and oxygenation  Description  INTERVENTIONS:  - Assess for changes in respiratory status  - Assess for changes in mentation and behavior  - Position to facilitate oxygenation and minimize respiratory effort  - Oxygen administered by appropriate delivery if ordered  - Initiate smoking cessation education as indicated  - Encourage broncho-pulmonary hygiene including cough, deep breathe, Incentive Spirometry  - Assess the need for suctioning and aspirate as needed  - Assess and instruct to report SOB or any respiratory difficulty  - Respiratory Therapy support as indicated  Outcome: Progressing     Problem: SKIN/TISSUE INTEGRITY - ADULT  Goal: Skin integrity remains intact  Description  INTERVENTIONS  - Identify patients at risk for skin breakdown  - Assess and monitor skin integrity  - Assess and monitor nutrition and hydration status  - Monitor labs (i e  albumin)  - Assess for incontinence   - Turn and reposition patient very two hours  - Assist with mobility/ambulation  - Relieve pressure over bony prominences  - Avoid friction and shearing  - Provide appropriate hygiene as needed including keeping skin clean and dry  - Evaluate need for skin moisturizer/barrier cream  - Collaborate with interdisciplinary team (i e  Nutrition, Rehabilitation, etc )   - Patient/family teaching   Outcome: Progressing  Goal: Incision(s), wounds(s) or drain site(s) healing without S/S of infection  Description  INTERVENTIONS  - Assess and document risk factors for skin impairment   - Assess and document dressing, incision, wound bed, drain sites and surrounding tissue  - Consider nutrition services referral as needed  - Oral mucous membranes remain intact  - Provide patient/ family education  Outcome: Progressing  Goal: Oral mucous membranes remain intact  Description  INTERVENTIONS  - Assess oral mucosa and hygiene practices  - Implement preventative oral hygiene regimen  - Implement oral medicated treatments as ordered  - Initiate Nutrition services referral as needed  Outcome: Progressing     Problem: MUSCULOSKELETAL - ADULT  Goal: Maintain or return mobility to safest level of function  Description  INTERVENTIONS:  - Assess patient's ability to carry out ADLs; assess patient's baseline for ADL function and identify physical deficits which impact ability to perform ADLs (bathing, care of mouth/teeth, toileting, grooming, dressing, etc )  - Assess/evaluate cause of self-care deficits   - Assess range of motion  - Assess patient's mobility  - Assess patient's need for assistive devices and provide as appropriate/roller walker  - Encourage maximum independence but intervene and supervise when necessary  - Involve family in performance of ADLs  - Assess for home care needs following discharge   - Consider OT consult to assist with ADL evaluation and planning for discharge  - Provide patient education as appropriate   Outcome: Progressing  Goal: Maintain proper alignment of affected body part  Description  INTERVENTIONS:  - Support, maintain and protect limb and body alignment  - Provide patient/ family with appropriate education  Outcome: Progressing     Problem: Prexisting or High Potential for Compromised Skin Integrity  Goal: Skin integrity is maintained or improved  Description  INTERVENTIONS:  - Identify patients at risk for skin breakdown  - Assess and monitor skin integrity  - Assess and monitor nutrition and hydration status  - Monitor labs   - Assess for incontinence   - Turn and reposition patient  - Assist with mobility/ambulation  - Relieve pressure over bony prominences  - Avoid friction and shearing  - Provide appropriate hygiene as needed including keeping skin clean and dry  - Evaluate need for skin moisturizer/barrier cream  - Collaborate with interdisciplinary team   - Patient/family teaching  - Consider wound care consult   Outcome: Progressing

## 2020-01-06 NOTE — PROGRESS NOTES
No wounds documented at this time, previous wounds noted but removed from flowsheets  Pt agreeable to glucerna daily  Pt has low intake of PRO and iron rich foods at home, reported by caregiver to be eating ice chips  Hemoglobin low at this time, may benefit from iron panel check  Given controlled DM and pt not wanting CCD diet, will not order at this time  Will monitor BS to see if CCD needed at follow up  Will monitor I/O, labs, weight, provide diet ed as appropriate

## 2020-01-06 NOTE — PLAN OF CARE
Problem: PHYSICAL THERAPY ADULT  Goal: Performs mobility at highest level of function for planned discharge setting  See evaluation for individualized goals  Description  Treatment/Interventions: Functional transfer training, LE strengthening/ROM, Therapeutic exercise, Endurance training, Elevations, Cognitive reorientation, Patient/family training, Equipment eval/education, Bed mobility, Gait training, Compensatory technique education, Spoke to nursing, OT, Spoke to case management  Equipment Recommended: Edith Hutchison       See flowsheet documentation for full assessment, interventions and recommendations  Note:   Prognosis: Good  Problem List: Decreased strength, Decreased endurance, Impaired balance, Decreased mobility, Decreased cognition, Impaired judgement, Decreased safety awareness, Pain  Assessment: Pt is a 71 y o  female seen for PT evaluation s/p admission to [address] [place] [address] on 1/4/2020 with Frequent falls  Pt with chronic R basal ganglia lacunar infarct per imaging  Chest CT indicating mild to moderate emphysema, suggest PNA  Chronic R shoulder fx with hardware, no acute fx  Order placed for PT services    Upon evaluation: Pt is presenting with impaired functional mobility due to pain, decreased strength, decreased endurance, impaired balance, gait deviations, impaired cognition, decreased safety awareness, impaired judgment, fall risk and impaired skin integrity requiring moderate assistance of two for bed mobility, moderate to maximal assistance for transfers and maximal assistance for ambulation with RW  Pt's clinical presentation is currently unpredictable given the functional mobility deficits above, especially weakness, decreased skin integrity, decreased endurance, gait deviations, pain, decreased activity tolerance, decreased functional mobility tolerance, decreased safety awareness, impaired judgement and decreased cognition, coupled with fall risks as indicated by AM-PAC 6-Clicks: 11/24 as well as hx of falls, impaired balance, impaired judgement, decreased safety awareness, decreased balance and decreased cognition and combined with medical complications of pain impacting overall mobility status, abnormal H&H, new onset O2 use, need for input for mobility technique/safety and elevated BNP  Pt's PMHx and comorbidities that may affect physical performance and progress include: COPD, CVA, Dementia, obesity, limited cognition and spinal stenosis, thrombocytopenia  Personal factors affecting pt at time of IE include: inaccessible home environment, step(s) to enter environment, inability to perform ADLs, inability to navigate level surfaces without external assistance, inability to ambulate household distances and recent fall(s)/fall history  Pt will benefit from continued skilled PT services to address deficits as defined above and to maximize level of functional mobility to facilitate return toward PLOF and improved QOL  From PT/mobility standpoint, recommendation at time of d/c would be Short term rehab pending progress in order to reduce fall risk and maximize pt's functional independence and consistency with mobility in order to facilitate return to PLOF  Recommend ther ex next 1-2 sessions  Recommendation: Short-term skilled PT     PT - OK to Discharge: (when medically cleared)    See flowsheet documentation for full assessment

## 2020-01-07 LAB
ANION GAP SERPL CALCULATED.3IONS-SCNC: 8 MMOL/L (ref 4–13)
BASE EX.OXY STD BLDV CALC-SCNC: 94 % (ref 60–80)
BASE EXCESS BLDV CALC-SCNC: 4.7 MMOL/L
BUN SERPL-MCNC: 16 MG/DL (ref 5–25)
CALCIUM SERPL-MCNC: 8.4 MG/DL (ref 8.3–10.1)
CHLORIDE SERPL-SCNC: 100 MMOL/L (ref 100–108)
CO2 SERPL-SCNC: 29 MMOL/L (ref 21–32)
CREAT SERPL-MCNC: 0.75 MG/DL (ref 0.6–1.3)
ERYTHROCYTE [DISTWIDTH] IN BLOOD BY AUTOMATED COUNT: 15.6 % (ref 11.6–15.1)
GFR SERPL CREATININE-BSD FRML MDRD: 84 ML/MIN/1.73SQ M
GLUCOSE SERPL-MCNC: 195 MG/DL (ref 65–140)
HCO3 BLDV-SCNC: 30 MMOL/L (ref 24–30)
HCT VFR BLD AUTO: 33.6 % (ref 34.8–46.1)
HGB BLD-MCNC: 11 G/DL (ref 11.5–15.4)
MCH RBC QN AUTO: 33.5 PG (ref 26.8–34.3)
MCHC RBC AUTO-ENTMCNC: 32.7 G/DL (ref 31.4–37.4)
MCV RBC AUTO: 102 FL (ref 82–98)
O2 CT BLDV-SCNC: 15.8 ML/DL
PCO2 BLDV: 47.6 MM HG (ref 42–50)
PH BLDV: 7.42 [PH] (ref 7.3–7.4)
PLATELET # BLD AUTO: 79 THOUSANDS/UL (ref 149–390)
PMV BLD AUTO: 11.2 FL (ref 8.9–12.7)
PO2 BLDV: 87.2 MM HG (ref 35–45)
POTASSIUM SERPL-SCNC: 4.1 MMOL/L (ref 3.5–5.3)
PROCALCITONIN SERPL-MCNC: 0.2 NG/ML
RBC # BLD AUTO: 3.28 MILLION/UL (ref 3.81–5.12)
SODIUM SERPL-SCNC: 137 MMOL/L (ref 136–145)
WBC # BLD AUTO: 3.41 THOUSAND/UL (ref 4.31–10.16)

## 2020-01-07 PROCEDURE — 82805 BLOOD GASES W/O2 SATURATION: CPT | Performed by: PHYSICIAN ASSISTANT

## 2020-01-07 PROCEDURE — 80048 BASIC METABOLIC PNL TOTAL CA: CPT | Performed by: FAMILY MEDICINE

## 2020-01-07 PROCEDURE — 99233 SBSQ HOSP IP/OBS HIGH 50: CPT | Performed by: INTERNAL MEDICINE

## 2020-01-07 PROCEDURE — 84145 PROCALCITONIN (PCT): CPT | Performed by: FAMILY MEDICINE

## 2020-01-07 PROCEDURE — 85027 COMPLETE CBC AUTOMATED: CPT | Performed by: FAMILY MEDICINE

## 2020-01-07 RX ORDER — NYSTATIN 100000 [USP'U]/G
POWDER TOPICAL 2 TIMES DAILY
Status: DISCONTINUED | OUTPATIENT
Start: 2020-01-07 | End: 2020-01-07

## 2020-01-07 RX ORDER — NYSTATIN 100000 [USP'U]/G
POWDER TOPICAL 2 TIMES DAILY
Status: DISCONTINUED | OUTPATIENT
Start: 2020-01-07 | End: 2020-01-08 | Stop reason: HOSPADM

## 2020-01-07 RX ORDER — RIVASTIGMINE 13.3 MG/24H
13.3 PATCH, EXTENDED RELEASE TRANSDERMAL DAILY
Status: DISCONTINUED | OUTPATIENT
Start: 2020-01-07 | End: 2020-01-08 | Stop reason: HOSPADM

## 2020-01-07 RX ADMIN — CYANOCOBALAMIN TAB 500 MCG 1000 MCG: 500 TAB at 08:46

## 2020-01-07 RX ADMIN — METHYLPREDNISOLONE SODIUM SUCCINATE 40 MG: 40 INJECTION, POWDER, FOR SOLUTION INTRAMUSCULAR; INTRAVENOUS at 22:35

## 2020-01-07 RX ADMIN — OXYBUTYNIN CHLORIDE 10 MG: 5 TABLET, EXTENDED RELEASE ORAL at 08:46

## 2020-01-07 RX ADMIN — METHYLPREDNISOLONE SODIUM SUCCINATE 40 MG: 40 INJECTION, POWDER, FOR SOLUTION INTRAMUSCULAR; INTRAVENOUS at 08:45

## 2020-01-07 RX ADMIN — ASPIRIN 81 MG 81 MG: 81 TABLET ORAL at 08:46

## 2020-01-07 RX ADMIN — MEMANTINE 10 MG: 10 TABLET ORAL at 17:51

## 2020-01-07 RX ADMIN — OXYCODONE HYDROCHLORIDE 10 MG: 10 TABLET ORAL at 22:34

## 2020-01-07 RX ADMIN — RIVASTIGMINE 13.3 MG: 13.3 PATCH, EXTENDED RELEASE TRANSDERMAL at 12:17

## 2020-01-07 RX ADMIN — MEMANTINE 10 MG: 10 TABLET ORAL at 08:45

## 2020-01-07 RX ADMIN — NYSTATIN: 100000 POWDER TOPICAL at 17:52

## 2020-01-07 RX ADMIN — PRAVASTATIN SODIUM 80 MG: 80 TABLET ORAL at 15:33

## 2020-01-07 RX ADMIN — ENOXAPARIN SODIUM 40 MG: 40 INJECTION SUBCUTANEOUS at 08:45

## 2020-01-07 RX ADMIN — AZITHROMYCIN 500 MG: 250 TABLET, FILM COATED ORAL at 22:34

## 2020-01-07 RX ADMIN — FUROSEMIDE 20 MG: 20 TABLET ORAL at 08:47

## 2020-01-07 RX ADMIN — ALLOPURINOL 100 MG: 100 TABLET ORAL at 17:51

## 2020-01-07 RX ADMIN — PANTOPRAZOLE SODIUM 40 MG: 40 TABLET, DELAYED RELEASE ORAL at 08:47

## 2020-01-07 RX ADMIN — ALLOPURINOL 100 MG: 100 TABLET ORAL at 08:45

## 2020-01-07 RX ADMIN — PREGABALIN 150 MG: 75 CAPSULE ORAL at 08:47

## 2020-01-07 RX ADMIN — PREGABALIN 150 MG: 75 CAPSULE ORAL at 15:33

## 2020-01-07 RX ADMIN — PREGABALIN 150 MG: 75 CAPSULE ORAL at 22:34

## 2020-01-07 RX ADMIN — CEFTRIAXONE 1000 MG: 1 INJECTION, SOLUTION INTRAVENOUS at 22:35

## 2020-01-07 RX ADMIN — SERTRALINE HYDROCHLORIDE 150 MG: 100 TABLET ORAL at 08:45

## 2020-01-07 NOTE — ASSESSMENT & PLAN NOTE
· BNP 2385  · Does not appear volume overloaded on exam  · No history of CHF and old records  · BNP from September 2019 is 3600  · Is on Lasix daily-will continue  · Check echocardiogram-official read pending

## 2020-01-07 NOTE — ASSESSMENT & PLAN NOTE
· Has history of dementia, is oriented x4 on exam  · Uses Exelon patch  Her dose of 13 3 mg per 24 hours is not formulary, family brought the patches from home  · Takes Namenda 28 mg XR-non formulary  Will use Namenda 10 mg b i d  Darletta Pac   · Supportive care

## 2020-01-07 NOTE — ASSESSMENT & PLAN NOTE
Lab Results   Component Value Date    HGBA1C 5 2 01/06/2020       Recent Labs     01/04/20  1709   POCGLU 82       Blood Sugar Average: Last 72 hrs:  (P) 82   · Patient refuses diabetic diet  · Blood sugar on BMP appears to be stable

## 2020-01-07 NOTE — ASSESSMENT & PLAN NOTE
· Initial presentation was for frequent falls and increased weakness  · PT, OT, case management consult appreciated  · Patient needs rehabilitation at the time of discharge

## 2020-01-07 NOTE — PLAN OF CARE
Problem: Potential for Falls  Goal: Patient will remain free of falls  Description  INTERVENTIONS:  - Assess patient frequently for physical needs  -  Identify cognitive and physical deficits and behaviors that affect risk of falls    -  [place] fall precautions as indicated by assessment   - Educate patient/family on patient safety including physical limitations  - Instruct patient to call for assistance with activity based on assessment  - Modify environment to reduce risk of injury  - Consider OT/PT consult to assist with strengthening/mobility  Outcome: Progressing     Problem: PAIN - ADULT  Goal: Verbalizes/displays adequate comfort level or baseline comfort level  Description  Interventions:  - Encourage patient to monitor pain and request assistance  - Assess pain using appropriate pain scale, 0-10  - Administer analgesics based on type and severity of pain and evaluate response  - Implement non-pharmacological measures as appropriate and evaluate response  - Consider cultural and social influences on pain and pain management  - Notify physician/advanced practitioner if interventions unsuccessful or patient reports new pain   Outcome: Progressing     Problem: INFECTION - ADULT  Goal: Absence or prevention of progression during hospitalization  Description  INTERVENTIONS:  - Assess and monitor for signs and symptoms of infection  - Monitor lab/diagnostic results  - Monitor all insertion sites, i e  indwelling lines, tubes, and drains  - Monitor endotracheal if appropriate and nasal secretions for changes in amount and color  - [place] appropriate cooling/warming therapies per order  - Administer medications as ordered  - Instruct and encourage patient and family to use good hand hygiene technique  - Identify and instruct in appropriate isolation precautions for identified infection/condition  Outcome: Progressing  Goal: Absence of fever/infection during neutropenic period  Description  INTERVENTIONS:  - Monitor WBC    Outcome: Progressing     Problem: SAFETY ADULT  Goal: Maintain or return to baseline ADL function  Description  INTERVENTIONS:  -  Assess patient's ability to carry out ADLs; assess patient's baseline for ADL function and identify physical deficits which impact ability to perform ADLs (bathing, care of mouth/teeth, toileting, grooming, dressing, etc )  - Assess/evaluate cause of self-care deficits   - Assess range of motion  - Assess patient's mobility; develop plan if impaired  - Assess patient's need for assistive devices and provide as appropriate  - Encourage maximum independence but intervene and supervise when necessary  - Involve family in performance of ADLs  - Assess for home care needs following discharge   - Consider OT consult to assist with ADL evaluation and planning for discharge  - Provide patient education as appropriate  Outcome: Progressing  Goal: Maintain or return mobility status to optimal level  Description  INTERVENTIONS:  - Assess patient's baseline mobility status (ambulation, transfers, stairs, etc )    - Identify cognitive and physical deficits and behaviors that affect mobility  - Identify mobility aids required to assist with transfers and/or ambulation (gait belt, sit-to-stand, lift, walker, cane, etc )  - [place] fall precautions as indicated by assessment  - Record patient progress and toleration of activity level on Mobility SBAR; progress patient to next Phase/Stage  - Instruct patient to call for assistance with activity based on assessment  - Consider rehabilitation consult to assist with strengthening/weightbearing, etc   Outcome: Progressing     Problem: DISCHARGE PLANNING  Goal: Discharge to home or other facility with appropriate resources  Description  INTERVENTIONS:  - Identify barriers to discharge w/patient and caregiver  - Arrange for needed discharge resources and transportation as appropriate  - Identify discharge learning needs (meds, wound care, etc )  - Arrange for interpretive services to assist at discharge as needed  - Refer to Case Management Department for coordinating discharge planning if the patient needs post-hospital services based on physician/advanced practitioner order or complex needs related to functional status, cognitive ability, or social support system  Outcome: Progressing     Problem: Knowledge Deficit  Goal: Patient/family/caregiver demonstrates understanding of disease process, treatment plan, medications, and discharge instructions  Description  Complete learning assessment and assess knowledge base    Interventions:  - Provide teaching at level of understanding  - Provide teaching via preferred learning methods  Outcome: Progressing     Problem: RESPIRATORY - ADULT  Goal: Achieves optimal ventilation and oxygenation  Description  INTERVENTIONS:  - Assess for changes in respiratory status  - Assess for changes in mentation and behavior  - Position to facilitate oxygenation and minimize respiratory effort  - Oxygen administered by appropriate delivery if ordered  - Initiate smoking cessation education as indicated  - Encourage broncho-pulmonary hygiene including cough, deep breathe, Incentive Spirometry  - Assess the need for suctioning and aspirate as needed  - Assess and instruct to report SOB or any respiratory difficulty  - Respiratory Therapy support as indicated  Outcome: Progressing     Problem: SKIN/TISSUE INTEGRITY - ADULT  Goal: Skin integrity remains intact  Description  INTERVENTIONS  - Identify patients at risk for skin breakdown  - Assess and monitor skin integrity  - Assess and monitor nutrition and hydration status  - Monitor labs (i e  albumin)  - Assess for incontinence   - Turn and reposition patient very two hours  - Assist with mobility/ambulation  - Relieve pressure over bony prominences  - Avoid friction and shearing  - Provide appropriate hygiene as needed including keeping skin clean and dry  - Evaluate need for skin moisturizer/barrier cream  - Collaborate with interdisciplinary team (i e  Nutrition, Rehabilitation, etc )   - Patient/family teaching   Outcome: Progressing  Goal: Incision(s), wounds(s) or drain site(s) healing without S/S of infection  Description  INTERVENTIONS  - Assess and document risk factors for skin impairment   - Assess and document dressing, incision, wound bed, drain sites and surrounding tissue  - Consider nutrition services referral as needed  - Oral mucous membranes remain intact  - Provide patient/ family education  Outcome: Progressing  Goal: Oral mucous membranes remain intact  Description  INTERVENTIONS  - Assess oral mucosa and hygiene practices  - Implement preventative oral hygiene regimen  - Implement oral medicated treatments as ordered  - Initiate Nutrition services referral as needed  Outcome: Progressing     Problem: MUSCULOSKELETAL - ADULT  Goal: Maintain or return mobility to safest level of function  Description  INTERVENTIONS:  - Assess patient's ability to carry out ADLs; assess patient's baseline for ADL function and identify physical deficits which impact ability to perform ADLs (bathing, care of mouth/teeth, toileting, grooming, dressing, etc )  - Assess/evaluate cause of self-care deficits   - Assess range of motion  - Assess patient's mobility  - Assess patient's need for assistive devices and provide as appropriate/roller walker  - Encourage maximum independence but intervene and supervise when necessary  - Involve family in performance of ADLs  - Assess for home care needs following discharge   - Consider OT consult to assist with ADL evaluation and planning for discharge  - Provide patient education as appropriate   Outcome: Progressing  Goal: Maintain proper alignment of affected body part  Description  INTERVENTIONS:  - Support, maintain and protect limb and body alignment  - Provide patient/ family with appropriate education  Outcome: Progressing     Problem: Prexisting or High Potential for Compromised Skin Integrity  Goal: Skin integrity is maintained or improved  Description  INTERVENTIONS:  - Identify patients at risk for skin breakdown  - Assess and monitor skin integrity  - Assess and monitor nutrition and hydration status  - Monitor labs   - Assess for incontinence   - Turn and reposition patient  - Assist with mobility/ambulation  - Relieve pressure over bony prominences  - Avoid friction and shearing  - Provide appropriate hygiene as needed including keeping skin clean and dry  - Evaluate need for skin moisturizer/barrier cream  - Collaborate with interdisciplinary team   - Patient/family teaching  - Consider wound care consult   Outcome: Progressing     Problem: Nutrition/Hydration-ADULT  Goal: Nutrient/Hydration intake appropriate for improving, restoring or maintaining nutritional needs  Description  Monitor and assess patient's nutrition/hydration status for malnutrition  Collaborate with interdisciplinary team and initiate plan and interventions as ordered  Monitor patient's weight and dietary intake as ordered or per policy  Utilize nutrition screening tool and intervene as necessary  Determine patient's food preferences and provide high-protein, high-caloric foods as appropriate       INTERVENTIONS:  - Monitor oral intake, urinary output, labs, and treatment plans  - Assess nutrition and hydration status and recommend course of action  - Evaluate amount of meals eaten  - Assist patient with eating if necessary   - Allow adequate time for meals  - Recommend/ encourage appropriate diets, oral nutritional supplements, and vitamin/mineral supplements  - Order, calculate, and assess calorie counts as needed  - Recommend, monitor, and adjust tube feedings and TPN/PPN based on assessed needs  - Assess need for intravenous fluids  - Provide specific nutrition/hydration education as appropriate  - Include patient/family/caregiver in decisions related to nutrition  Outcome: Progressing

## 2020-01-07 NOTE — QUICK NOTE
I was informed by the nursing staff that the patient developed hypoxia on the pulse oximetry monitor  The patient was initiated on continuous supplemental oxygen via the nasal cannula to maintain oxygen saturation levels at 90% and above

## 2020-01-07 NOTE — PROGRESS NOTES
patches from home  · Takes Namenda 28 mg XR-non formulary  Will use Namenda 10 mg b i d  Jose Pipe · Supportive care    Chronic back pain  Assessment & Plan  · History of chronic back pain due to disc disease  · Takes oxycodone 10 mg p r n  At home  · Will use Adult Pain Management set    Thrombocytopenia (HCC)  Assessment & Plan  · Her 6 Platelets [id number] to 84, but today platelet count is 95  · Daily CBC and monitor platelets    * Frequent falls  Assessment & Plan  · Initial presentation was for frequent falls and increased weakness  · PT, OT, case management consult appreciated  · Patient needs rehabilitation at the time of discharge    VTE Pharmacologic Prophylaxis:   Pharmacologic: Enoxaparin (Lovenox)    Patient Centered Rounds: I have performed bedside rounds with nursing staff today    Discussions with Specialists or Other Care Team Provider:     Education and Discussions with Family / Patient:     Current Length of Stay: 3 day(s)    Current Patient Status: Inpatient   Certification Statement: The patient will continue to require additional inpatient hospital stay due to Pneumonia, COPD exacerbation    Discharge Plan:  Probably tomorrow    Code Status: Level 1 - Full Code      Subjective:   Patient said she is improving now  Had episodes of hypoxia  Patient might need a sleep study would probably be ALFRED  Objective:     Vitals:   Temp (24hrs), Av 3 °F (36 8 °C), Min:98 °F (36 7 °C), Max:98 5 °F (36 9 °C)    Temp:  [98 °F (36 7 °C)-98 5 °F (36 9 °C)] 98 5 °F (36 9 °C)  HR:  [64-68] 65  Resp:  [14-19] 18  BP: (113-138)/(59-74) 138/74  SpO2:  [88 %-94 %] 94 %  Body mass index is 40 54 kg/m²  Input and Output Summary (last 24 hours):        Intake/Output Summary (Last 24 hours) at 2020 1005  Last data filed at 2020 0244  Gross per 24 hour   Intake 50 ml   Output 187 ml   Net -137 ml       Physical Exam:     General appearance: alert, appears stated age and cooperative  Head: Normocephalic, without obvious abnormality, atraumatic  Lungs: clear to auscultation bilaterally  Heart: regular rate and rhythm  Abdomen: soft, non-tender, positive bowel sounds   Back: negative  Extremities: extremities atraumatic, no cyanosis or edema  Neurologic: Alert and oriented X 3, normal strength and tone  Normal symmetric reflexes  Normal coordination and gait    Additional Data:     Labs:    Results from last 7 days   Lab Units 01/07/20  0610  01/05/20  0647   WBC Thousand/uL 3 41*   < > 4 60   HEMOGLOBIN g/dL 11 0*   < > 11 0*   HEMATOCRIT % 33 6*   < > 33 4*   PLATELETS Thousands/uL 79*   < > 84*   NEUTROS PCT %  --   --  72   LYMPHS PCT %  --   --  16   MONOS PCT %  --   --  8   EOS PCT %  --   --  4    < > = values in this interval not displayed  Results from last 7 days   Lab Units 01/07/20  0610  01/05/20  0647   SODIUM mmol/L 137   < > 141   POTASSIUM mmol/L 4 1   < > 4 1   CHLORIDE mmol/L 100   < > 104   CO2 mmol/L 29   < > 29   BUN mg/dL 16   < > 18   CREATININE mg/dL 0 75   < > 0 73   ANION GAP mmol/L 8   < > 8   CALCIUM mg/dL 8 4   < > 8 4   ALBUMIN g/dL  --   --  2 8*   TOTAL BILIRUBIN mg/dL  --   --  0 78   ALK PHOS U/L  --   --  134*   ALT U/L  --   --  38   AST U/L  --   --  108*   GLUCOSE RANDOM mg/dL 195*   < > 81    < > = values in this interval not displayed  Results from last 7 days   Lab Units 01/04/20  1758   INR  1 14     Results from last 7 days   Lab Units 01/04/20  1709   POC GLUCOSE mg/dl 82     Results from last 7 days   Lab Units 01/06/20  0503   HEMOGLOBIN A1C % 5 2     Results from last 7 days   Lab Units 01/06/20  0503 01/05/20  0647 01/05/20  0009 01/04/20  1958 01/04/20  1759   LACTIC ACID mmol/L  --   --   --  1 0 0 5   PROCALCITONIN ng/ml 0 29* 0 22 0 21  --   --            * I Have Reviewed All Lab Data Listed Above  * Additional Pertinent Lab Tests Reviewed:  All Labs For Current Hospital Admission Reviewed    Imaging:    Imaging Reports Reviewed Today Include: images reviewed    Recent Cultures (last 7 days):     Results from last 7 days   Lab Units 01/04/20  1803 01/04/20  1757 01/04/20  1756   BLOOD CULTURE   --  No Growth at 48 hrs  No Growth at 48 hrs  URINE CULTURE  20,000-29,000 cfu/ml   --   --        Last 24 Hours Medication List:     Current Facility-Administered Medications:  acetaminophen 650 mg Oral Q4H PRN Guru Ly, CRNP    allopurinol 100 mg Oral BID Guru Ly, CRNP    aspirin 81 mg Oral Daily Guru Ly, CRNP    cefTRIAXone 1,000 mg Intravenous Q24H Guru Ly, CRNP Last Rate: Stopped (01/06/20 2240)   And        azithromycin 500 mg Oral Q24H Guru Ly, CRNP    calcium carbonate 500 mg Oral Daily PRN Guru Ly, CRNP    vitamin B-12 1,000 mcg Oral Daily Guru Ly, CRNP    enoxaparin 40 mg Subcutaneous Daily Guru Ly, CRNP    furosemide 20 mg Oral BID Guru Ly, CRNP    HYDROmorphone 0 5 mg Intravenous Q4H PRN Guru Ly, CRNP    memantine 10 mg Oral BID Guru Ly, CRNP    methylPREDNISolone sodium succinate 40 mg Intravenous Q12H [facility] Goran Jauregui, CRNP    naloxone 0 04 mg Intravenous Q1MIN PRN Guru Ly, CRNP    ondansetron 4 mg Intravenous Q4H PRN Guru Ly, CRNP    oxybutynin 10 mg Oral Daily Guru Ly, CRNP    oxyCODONE 10 mg Oral Q4H PRN Guru Ly, CRNP    oxyCODONE 5 mg Oral Q4H PRN Guru Ly, CRNP    pantoprazole 40 mg Oral Daily Guru Ly, CRNP    pravastatin 80 mg Oral Daily With [address] Chares Overcast, CRNP    pregabalin 150 mg Oral TID Jeri Torres MD    rivastigmine 13 3 mg Transdermal Daily Sarah Moody MD    sertraline 150 mg Oral Daily Guru Ly, CRNP         Today, Patient Was Seen By: Sarah Moody MD    ** Please Note: Dictation voice to text software may have been used in the creation of this document   **

## 2020-01-07 NOTE — PROGRESS NOTES
Progress Note - Mars Jozef 1955, 71 y o  female MRN: [id number]    Unit/Bed#: -01 Encounter: [id number]    Primary Care Provider: Kierra Plummer MD   Date and time admitted to hospital: 1/4/2020  4:49 PM        * Frequent falls  Assessment & Plan  · Initial presentation was for frequent falls and increased weakness  · PT, OT, case management consult appreciated  · Patient needs rehabilitation at the time of discharge    COPD (chronic obstructive pulmonary disease) ([facility])  Assessment & Plan  · Quit smoking 2 years ago  · Does not appear to be in exacerbation  · CT chest abdomen pelvis shows mild-to-moderate emphysema  · Oxygen protocol  · Respiratory protocol  · Monitor    Elevated brain natriuretic peptide (BNP) level  Assessment & Plan  · BNP 2385  · Does not appear volume overloaded on exam  · No history of CHF and old records  · BNP from September 2019 is 3600  · Is on Lasix daily-will continue  · Check echocardiogram-official read pending    Diabetes mellitus type 2, diet-controlled ([facility] )  Assessment & Plan  Lab Results   Component Value Date    HGBA1C 5 2 01/06/2020       Recent Labs     01/04/20  1709   POCGLU 82       Blood Sugar Average: Last 72 hrs:  (P) 82   · Patient refuses diabetic diet  · Blood sugar on BMP appears to be stable    Dementia ([facility])  Assessment & Plan  · Has history of dementia, is oriented x4 on exam  · Uses Exelon patch  Her dose of 13 3 mg per 24 hours is not formulary, family brought the patches from home  · Takes Namenda 28 mg XR-non formulary  Will use Namenda 10 mg b i d  Francois Morrow · Supportive care    Pneumonia  Assessment & Plan  · Initial presentation was for frequent falls and increased weakness  · CT chest abdomen pelvis with contrast:  Right middle and lower lobe opacity suggesting pneumonia  · Initial O2 sat in the ER was 89% on room air  Respirations recorded at 26  · Flu swab:  Negative  · Low-grade fever noted today    · Received Levaquin in the ER  · Patient do not have any respiratory symptoms  · Continue with the antibiotics for now  · Follow-up on the procalcitonin-up trending,  Will recheck in the morning  · Since the patient developed low-grade fever with elevation in procalcitonin level we will continue with the antibiotics      Chronic back pain  Assessment & Plan  · History of chronic back pain due to disc disease  · Takes oxycodone 10 mg p r n  At home  · Will use Adult Pain Management set    Thrombocytopenia (HCC)  Assessment & Plan  · Her 6 Platelets [id number] to 84, but today platelet count is 95  · Daily CBC and monitor platelets      VTE Pharmacologic Prophylaxis:   Pharmacologic: Enoxaparin (Lovenox)  Mechanical VTE Prophylaxis in Place: Yes    Patient Centered Rounds: I have performed bedside rounds with nursing staff today  Discussions with Specialists or Other Care Team Provider:     Education and Discussions with Family / Patient:  Significant other updated in the room    Time Spent for Care: 30 minutes  More than 50% of total time spent on counseling and coordination of care as described above  Current Length of Stay: 2 day(s)    Current Patient Status: Inpatient   Certification Statement: The patient will continue to require additional inpatient hospital stay due to Pneumonia    Discharge Plan:     Code Status: Level 1 - Full Code      Subjective:   Patient seen and examined  Patient reported that when she gets antibiotic she gets vaginal in infection and she is requesting a Diflucan on day 1 and day 3  Discussed with Orthopedics  No need for any intervention at this point  Patient reported that her breathing is stable    Significant other in the room reported that patient temperature usually runs low and patient has a tendency to going to sepsis with infections  Objective:     Vitals:   Temp (24hrs), Av 3 °F (37 4 °C), Min:98 °F (36 7 °C), Max:100 2 °F (37 9 °C)    Temp:  [98 °F (36 7 °C)-100 2 °F (37 9 °C)] 98 °F (36 7 °C)  HR:  [62-71] 68  Resp:  [14-21] 14  BP: (113-117)/(59-64) 116/61  SpO2:  [88 %-95 %] 91 %  Body mass index is 40 73 kg/m²  Input and Output Summary (last 24 hours): Intake/Output Summary (Last 24 hours) at 1/6/2020 1917  Last data filed at 1/6/2020 0830  Gross per 24 hour   Intake 290 ml   Output    Net 290 ml       Physical Exam:     Physical Exam   Constitutional: She appears well-developed  No distress  HENT:    healing scab from previous fall on the right scalp   Eyes: Pupils are equal, round, and reactive to light  Cardiovascular: Normal rate  No murmur heard  Pulmonary/Chest:   Decreased breath sounds bilateral   Abdominal: Soft  A hernia is present  Musculoskeletal:   Multiple wounds on right lower extremity   Neurological: She is alert  Additional Data:     Labs:    Results from last 7 days   Lab Units 01/06/20  0503 01/05/20  0647   WBC Thousand/uL 5 53 4 60   HEMOGLOBIN g/dL 10 9* 11 0*   HEMATOCRIT % 33 6* 33 4*   PLATELETS Thousands/uL 95* 84*   NEUTROS PCT %  --  72   LYMPHS PCT %  --  16   MONOS PCT %  --  8   EOS PCT %  --  4     Results from last 7 days   Lab Units 01/06/20  0503 01/05/20  0647   POTASSIUM mmol/L 3 5 4 1   CHLORIDE mmol/L 101 104   CO2 mmol/L 31 29   BUN mg/dL 19 18   CREATININE mg/dL 0 87 0 73   CALCIUM mg/dL 8 3 8 4   ALK PHOS U/L  --  134*   ALT U/L  --  38   AST U/L  --  108*     Results from last 7 days   Lab Units 01/04/20  1758   INR  1 14       * I Have Reviewed All Lab Data Listed Above  * Additional Pertinent Lab Tests Reviewed: Jenna 65 Admission Reviewed    Imaging:    Imaging Reports Reviewed Today Include:   Imaging Personally Reviewed by Myself Includes:      Recent Cultures (last 7 days):     Results from last 7 days   Lab Units 01/04/20  1803 01/04/20  1757 01/04/20  1756   BLOOD CULTURE   --  No Growth at 24 hrs  No Growth at 24 hrs     URINE CULTURE  20,000-29,000 cfu/ml   --   --        Last 24 Hours Medication List:     Current Facility-Administered Medications:  acetaminophen 650 mg Oral Q4H PRN Brittnee Barbosa, CRNP    allopurinol 100 mg Oral BID Brittnee Barbosa, CRNP    aspirin 81 mg Oral Daily Brittnee Barbosa, CRNP    cefTRIAXone 1,000 mg Intravenous Q24H Brittnee Barbosa, CRNP Last Rate: Stopped (01/05/20 2201)   And        azithromycin 500 mg Oral Q24H Brittnee Barbosa, CRNP    calcium carbonate 500 mg Oral Daily PRN Brittnee Barbosa, CRNP    vitamin B-12 1,000 mcg Oral Daily Brittnee Barbosa, CRNP    enoxaparin 40 mg Subcutaneous Daily Brittnee Barbosa, CRNP    furosemide 20 mg Oral BID Brittnee Barbosa, CRNP    HYDROmorphone 0 5 mg Intravenous Q4H PRN Brittnee Barbosa, CRNP    memantine 10 mg Oral BID Brittnee Barbosa, CRNP    naloxone 0 04 mg Intravenous Q1MIN PRN Brittnee Barbosa, CRNP    ondansetron 4 mg Intravenous Q4H PRN Brittnee Barbosa, CRNP    oxybutynin 10 mg Oral Daily Brittnee Barbosa, CRNP    oxyCODONE 10 mg Oral Q4H PRN Brittnee Barbosa, CRNP    oxyCODONE 5 mg Oral Q4H PRN Brittnee Barbosa, CRNP    pantoprazole 40 mg Oral Daily Brittnee Barbosa, CRNP    pravastatin 80 mg Oral Daily With [address] Sarkis Kirby, CRNP    pregabalin 150 mg Oral TID Amanda Drummond MD    rivastigmine 13 3 mg Transdermal Daily Amanda Drummond MD    sertraline 150 mg Oral Daily Brittnee Barbosa, CRNP         Today, Patient Was Seen By: Amanda Drummond MD    ** Please Note: Dictation voice to text software may have been used in the creation of this document   **

## 2020-01-07 NOTE — ASSESSMENT & PLAN NOTE
· Initial presentation was for frequent falls and increased weakness  · CT chest abdomen pelvis with contrast:  Right middle and lower lobe opacity suggesting pneumonia  · Initial O2 sat in the ER was 89% on room air  Respirations recorded at 26  · Flu swab:  Negative  · Low-grade fever noted today    · Received Levaquin in the ER  · Patient do not have any respiratory symptoms  · Continue with the antibiotics for now  · Follow-up on the procalcitonin-up trending,  · Continue IV antibiotics

## 2020-01-07 NOTE — ASSESSMENT & PLAN NOTE
· Utilize continuous supplemental oxygen via the nasal cannula to maintain oxygen saturation levels at 90% and above  · Respiratory protocol

## 2020-01-07 NOTE — ASSESSMENT & PLAN NOTE
· Has history of dementia, is oriented x4 on exam  · Uses Exelon patch  Her dose of 13 3 mg per 24 hours is not formulary, family brought the patches from home  · Takes Namenda 28 mg XR-non formulary  Will use Namenda 10 mg b i d  Tanmay Marlow   · Supportive care

## 2020-01-07 NOTE — QUICK NOTE
Asked to come see patient secondary to increased lethargy  Patient seen examined in her room with the nurse at bedside  Patient immediately responsive when her name is called  Is alert and oriented times person place and time  Also oriented to why she is in the hospital   During her assessment, discussion about checking a blood gas was initiated and the patient's eyes got very wide and she stated 0h you' renot going to do that on me! That hurts "   Patient's respirations to have a prolonged exhalation with a mild expiratory wheeze  She does not appear to be in distress  She does fall asleep easily during exam however wakes up easily and is oriented  Was placed on oxygen secondary to hypoxemia several hours ago  Does have COPD  Will add low-dose steroid secondary to bronchospasm and would favor an ABG however will postpone at this time  Patient is aware that if her condition changes or worsens, an ABG will need to be drawn

## 2020-01-07 NOTE — ASSESSMENT & PLAN NOTE
· Initial presentation was for frequent falls and increased weakness  · CT chest abdomen pelvis with contrast:  Right middle and lower lobe opacity suggesting pneumonia  · Initial O2 sat in the ER was 89% on room air  Respirations recorded at 26  · Flu swab:  Negative  · Low-grade fever noted today  · Received Levaquin in the ER  · Patient do not have any respiratory symptoms  · Continue with the antibiotics for now  · Follow-up on the procalcitonin-up trending,    Will recheck in the morning  · Since the patient developed low-grade fever with elevation in procalcitonin level we will continue with the antibiotics

## 2020-01-08 VITALS
RESPIRATION RATE: 19 BRPM | TEMPERATURE: 97.6 F | OXYGEN SATURATION: 91 % | SYSTOLIC BLOOD PRESSURE: 157 MMHG | HEART RATE: 57 BPM | DIASTOLIC BLOOD PRESSURE: 75 MMHG | HEIGHT: 63 IN | BODY MASS INDEX: 40.04 KG/M2 | WEIGHT: 225.97 LBS

## 2020-01-08 LAB
BASOPHILS # BLD AUTO: 0.02 THOUSANDS/ΜL (ref 0–0.1)
BASOPHILS NFR BLD AUTO: 0 % (ref 0–1)
EOSINOPHIL # BLD AUTO: 0 THOUSAND/ΜL (ref 0–0.61)
EOSINOPHIL NFR BLD AUTO: 0 % (ref 0–6)
ERYTHROCYTE [DISTWIDTH] IN BLOOD BY AUTOMATED COUNT: 15.4 % (ref 11.6–15.1)
HCT VFR BLD AUTO: 35 % (ref 34.8–46.1)
HGB BLD-MCNC: 11.1 G/DL (ref 11.5–15.4)
IMM GRANULOCYTES # BLD AUTO: 0.01 THOUSAND/UL (ref 0–0.2)
IMM GRANULOCYTES NFR BLD AUTO: 0 % (ref 0–2)
LYMPHOCYTES # BLD AUTO: 0.76 THOUSANDS/ΜL (ref 0.6–4.47)
LYMPHOCYTES NFR BLD AUTO: 17 % (ref 14–44)
MCH RBC QN AUTO: 32.4 PG (ref 26.8–34.3)
MCHC RBC AUTO-ENTMCNC: 31.7 G/DL (ref 31.4–37.4)
MCV RBC AUTO: 102 FL (ref 82–98)
MONOCYTES # BLD AUTO: 0.2 THOUSAND/ΜL (ref 0.17–1.22)
MONOCYTES NFR BLD AUTO: 4 % (ref 4–12)
NEUTROPHILS # BLD AUTO: 3.59 THOUSANDS/ΜL (ref 1.85–7.62)
NEUTS SEG NFR BLD AUTO: 79 % (ref 43–75)
NRBC BLD AUTO-RTO: 0 /100 WBCS
PLATELET # BLD AUTO: 93 THOUSANDS/UL (ref 149–390)
PMV BLD AUTO: 11.9 FL (ref 8.9–12.7)
RBC # BLD AUTO: 3.43 MILLION/UL (ref 3.81–5.12)
WBC # BLD AUTO: 4.58 THOUSAND/UL (ref 4.31–10.16)

## 2020-01-08 PROCEDURE — 99239 HOSP IP/OBS DSCHRG MGMT >30: CPT | Performed by: INTERNAL MEDICINE

## 2020-01-08 PROCEDURE — 85025 COMPLETE CBC W/AUTO DIFF WBC: CPT | Performed by: INTERNAL MEDICINE

## 2020-01-08 RX ORDER — PREDNISONE 1 MG/1
TABLET ORAL
Qty: 45 TABLET | Refills: 0
Start: 2020-01-08 | End: 2020-01-20

## 2020-01-08 RX ORDER — ACETAMINOPHEN 325 MG/1
650 TABLET ORAL EVERY 4 HOURS PRN
Qty: 30 TABLET | Refills: 0
Start: 2020-01-08

## 2020-01-08 RX ORDER — OXYCODONE HCL 10 MG/1
10 TABLET, FILM COATED, EXTENDED RELEASE ORAL EVERY 6 HOURS
Qty: 10 TABLET | Refills: 0 | Status: SHIPPED | OUTPATIENT
Start: 2020-01-08 | End: 2020-01-18

## 2020-01-08 RX ORDER — PANTOPRAZOLE SODIUM 20 MG/1
20 TABLET, DELAYED RELEASE ORAL DAILY
Qty: 30 TABLET | Refills: 0
Start: 2020-01-08

## 2020-01-08 RX ORDER — IPRATROPIUM BROMIDE AND ALBUTEROL SULFATE 2.5; .5 MG/3ML; MG/3ML
3 SOLUTION RESPIRATORY (INHALATION) EVERY 6 HOURS PRN
Refills: 0
Start: 2020-01-08

## 2020-01-08 RX ORDER — AMOXICILLIN AND CLAVULANATE POTASSIUM 875; 125 MG/1; MG/1
1 TABLET, FILM COATED ORAL EVERY 12 HOURS SCHEDULED
Qty: 10 TABLET | Refills: 0
Start: 2020-01-08 | End: 2020-01-13

## 2020-01-08 RX ORDER — ALBUTEROL SULFATE 90 UG/1
2 AEROSOL, METERED RESPIRATORY (INHALATION) EVERY 6 HOURS PRN
Qty: 18 G | Refills: 0
Start: 2020-01-08

## 2020-01-08 RX ORDER — AMOXICILLIN AND CLAVULANATE POTASSIUM 875; 125 MG/1; MG/1
1 TABLET, FILM COATED ORAL EVERY 12 HOURS SCHEDULED
Qty: 10 TABLET | Refills: 0
Start: 2020-01-08 | End: 2020-01-08 | Stop reason: SDUPTHER

## 2020-01-08 RX ADMIN — PANTOPRAZOLE SODIUM 40 MG: 40 TABLET, DELAYED RELEASE ORAL at 09:26

## 2020-01-08 RX ADMIN — NYSTATIN: 100000 POWDER TOPICAL at 09:25

## 2020-01-08 RX ADMIN — ALLOPURINOL 100 MG: 100 TABLET ORAL at 09:26

## 2020-01-08 RX ADMIN — MEMANTINE 10 MG: 10 TABLET ORAL at 09:25

## 2020-01-08 RX ADMIN — ASPIRIN 81 MG 81 MG: 81 TABLET ORAL at 09:26

## 2020-01-08 RX ADMIN — ENOXAPARIN SODIUM 40 MG: 40 INJECTION SUBCUTANEOUS at 09:25

## 2020-01-08 RX ADMIN — PREGABALIN 150 MG: 75 CAPSULE ORAL at 09:26

## 2020-01-08 RX ADMIN — SERTRALINE HYDROCHLORIDE 150 MG: 100 TABLET ORAL at 09:26

## 2020-01-08 RX ADMIN — RIVASTIGMINE 13.3 MG: 13.3 PATCH, EXTENDED RELEASE TRANSDERMAL at 09:57

## 2020-01-08 RX ADMIN — FUROSEMIDE 20 MG: 20 TABLET ORAL at 09:26

## 2020-01-08 RX ADMIN — METHYLPREDNISOLONE SODIUM SUCCINATE 40 MG: 40 INJECTION, POWDER, FOR SOLUTION INTRAMUSCULAR; INTRAVENOUS at 09:25

## 2020-01-08 RX ADMIN — CYANOCOBALAMIN TAB 500 MCG 1000 MCG: 500 TAB at 09:26

## 2020-01-08 RX ADMIN — OXYBUTYNIN CHLORIDE 10 MG: 5 TABLET, EXTENDED RELEASE ORAL at 09:26

## 2020-01-08 NOTE — ASSESSMENT & PLAN NOTE
· Utilize continuous supplemental oxygen 4 L via nasal cannula to maintain oxygen saturation levels at 90% and above  · Hypoxia could be from pneumonia, ALFRED, obesity hypoventilation syndrome  Needs sleep study however patient refuses for sleep study or BiPAP/CPAP    Patient needs to follow up with primary care physician and Pulmonary as an outpatient

## 2020-01-08 NOTE — DISCHARGE SUMMARY
Discharge- [facility] 1955, 71 y o  female MRN: [id number]    Unit/Bed#: -01 Encounter: [id number]    Primary Care Provider: Kierra Plummer MD   Date and time admitted to hospital: 1/4/2020  4:49 PM    Morbid obesity  - Life style modification and out patient follow up for weight loss management  Pneumonia  Assessment & Plan  · Initial presentation was for frequent falls and increased weakness  · Treated for right middle and lower lobe pneumonia and responded very well  · Off oxygen patient has desaturation to mid 80s and on 3 L oxygen saturation is between 94-98%  · Patient previously on 24 hour oxygen 4 L at rest and 6 L with exertion  As per patient's family patient was slowly tapered off of oxygen and was comfortable on room air after discussing with her primary care physician  Patient advised to keep the oxygen for 24 hours for now and follow up with primary care physician to see her clinical response  Patient sent with Augmentin to complete her course of antibiotics  Hypoxia  Assessment & Plan  · Utilize continuous supplemental oxygen 4 L via nasal cannula to maintain oxygen saturation levels at 90% and above  · Hypoxia could be from pneumonia, ALFRED, obesity hypoventilation syndrome  Needs sleep study however patient refuses for sleep study or BiPAP/CPAP  Patient needs to follow up with primary care physician and Pulmonary as an outpatient    COPD (chronic obstructive pulmonary disease) ([facility] [address] )  Assessment & Plan  · Quit smoking 2 years ago  · Does not appear to be in exacerbation  · CT chest abdomen pelvis shows mild-to-moderate emphysema  · Continuous oxygen for now and to follow up with her primary care physician as an outpatient  · Home oxygen assessment need to be made before discharge from rehab  Elevated brain natriuretic peptide (BNP) level  Assessment & Plan  · BNP 2385, EF 55% with apical and inferior hypokinesis, grade 1 diastolic dysfunction    · Does not appear volume overloaded on exam  · Continue home dose Lasix and follow with Cardiology as an outpatient    Diabetes mellitus type 2, diet-controlled ([facility] [address] )  Assessment & Plan  Lab Results   Component Value Date    HGBA1C 5 2 01/06/2020       No results for input(s): POCGLU in the last 72 hours  Blood Sugar Average: Last 72 hrs:     · Continue to monitor as an outpatient    Dementia [facility])  Assessment & Plan    · Uses Exelon patch  Her dose of 13 3 mg per 24 hours is not formulary, family brought the patches from home  · Continue home dose Namenda    Chronic back pain  Assessment & Plan  · History of chronic back pain due to disc disease  · Takes oxycodone 10 mg p r n  At home      Thrombocytopenia [facility])  Assessment & Plan  · Platelet is on lower side but stable  Probably related with the acute illness  Follow platelet level as an outpatient with primary care physician  * Frequent falls  Assessment & Plan  · Initial presentation was for frequent falls and increased weakness  · PT, OT, case management consult appreciated  · Patient is medically stable to go to rehab      Discharging Physician / Practitioner: Ernesto Peraza MD  PCP: Nelly Marroquin MD  Admission Date:   Admission Orders (From admission, onward)     Ordered        01/04/20 1956  Inpatient Admission  Once                   Discharge Date: 01/08/20    Disposition:      Short Term Rehab or SNF at [place] [address] (see below)    For Discharges to [facility] SNF:   · Not Applicable to this Patient - Not Applicable to this Patient    Reason for Admission:  Shortness of breath    Discharge Diagnoses:     Please see assessment and plan section above for further details regarding discharge diagnoses       Resolved Problems  Date Reviewed: 1/6/2020    None          Consultations During Hospital Stay:  IP CONSULT TO CASE MANAGEMENT  IP CONSULT TO WOUND CARE  IP CONSULT TO ORTHOPEDIC SURGERY     Procedures Performed:   * No surgery found *      Xr Shoulder 2+ Vw Right    Result Date: 1/6/2020  Narrative: RIGHT SHOULDER INDICATION:   shoulder pain  Multiple falls with right shoulder pain; history of surgery COMPARISON:  None VIEWS:  XR SHOULDER 2+ VW RIGHT FINDINGS: The patient is status post open reduction and internal fixation of proximal humerus fracture  The orthopedic plate and screw device appears to be intact without malalignment  No fracture can be seen distally or elsewhere  Severe degenerative changes of the glenohumeral joint is noted  No lytic or blastic lesions are seen  Soft tissues are unremarkable  Impression: Chronic fracture with orthopedic hardware  No evidence of fracture or malalignment  Workstation performed: [id number]     Ct Head Without Contrast    Result Date: 1/4/2020  Narrative: CT BRAIN - WITHOUT CONTRAST INDICATION:   fall  COMPARISON:  None  TECHNIQUE:  CT examination of the brain was performed  In addition to axial images, coronal 2D reformatted images were created and submitted for interpretation  Radiation dose length product (DLP) for this visit:  896 mGy-cm   This examination, like all CT scans performed in the [facility], was performed utilizing techniques to minimize radiation dose exposure, including the use of iterative reconstruction and automated exposure control  IMAGE QUALITY:  Diagnostic  FINDINGS: PARENCHYMA: Decreased attenuation is noted in periventricular and subcortical white matter demonstrating an appearance that is statistically most likely to represent mild microangiopathic change  Chronic right basal ganglia lacunar infarct is noted  No CT signs of acute infarction  No intracranial mass, mass effect or midline shift  No acute parenchymal hemorrhage  VENTRICLES AND EXTRA-AXIAL SPACES:  Normal for the patient's age  VISUALIZED ORBITS AND PARANASAL SINUSES:  Unremarkable  CALVARIUM AND EXTRACRANIAL SOFT TISSUES:  Normal      Impression: No acute intracranial abnormality  Chronic right basal ganglia lacunar infarct  Workstation performed: [id number]     Ct Cervical Spine Without Contrast    Result Date: 1/4/2020  Narrative: CT CERVICAL SPINE - WITHOUT CONTRAST INDICATION:   fall  COMPARISON:  None  TECHNIQUE:  CT examination of the cervical spine was performed without intravenous contrast   Contiguous axial images were obtained  Sagittal and coronal reconstructions were performed  Radiation dose length product (DLP) for this visit:  525 mGy-cm   This examination, like all CT scans performed in the [facility], was performed utilizing techniques to minimize radiation dose exposure, including the use of iterative reconstruction and automated exposure control  IMAGE QUALITY:  Diagnostic  FINDINGS: ALIGNMENT:  Normal alignment of the cervical spine  No subluxation  VERTEBRAL BODIES:  No fracture  DEGENERATIVE CHANGES:  Severe multilevel cervical degenerative changes are noted  No critical central canal stenosis  PREVERTEBRAL AND PARASPINAL SOFT TISSUES:  Unremarkable  THORACIC INLET:  Normal      Impression: No cervical spine fracture or traumatic malalignment  Workstation performed: [id number]     Ct Chest Abdomen Pelvis W Contrast    Result Date: 1/4/2020  Narrative: CT CHEST, ABDOMEN AND PELVIS WITH IV CONTRAST INDICATION:   fall  COMPARISON:  None  TECHNIQUE: CT examination of the chest, abdomen and pelvis was performed  Axial, sagittal, and coronal 2D reformatted images were created from the source data and submitted for interpretation  Radiation dose length product (DLP) for this visit:  1528 mGy-cm   This examination, like all CT scans performed in the [facility], was performed utilizing techniques to minimize radiation dose exposure, including the use of iterative reconstruction and automated exposure control  IV Contrast:  100 mL of iohexol (OMNIPAQUE) Enteric Contrast: Enteric contrast was administered   FINDINGS: CHEST LUNGS:  Mild to moderate emphysema is present  Right middle and lower lobe opacities are seen likely representing pneumonia  PLEURA:  Unremarkable  HEART/GREAT VESSELS:  Cardiomegaly is present MEDIASTINUM AND KYLAH:  Unremarkable  CHEST WALL AND LOWER NECK:   Unremarkable  ABDOMEN LIVER/BILIARY TREE:  Unremarkable  GALLBLADDER:  There are gallstone(s) within the gallbladder, without pericholecystic inflammatory changes  SPLEEN:  Spleen is enlarged measuring 14 cm  PANCREAS:  Unremarkable  ADRENAL GLANDS:  Unremarkable  KIDNEYS/URETERS:  One or more simple renal cyst(s) is noted  Otherwise unremarkable kidneys  No hydronephrosis  STOMACH AND BOWEL:  There is colonic diverticulosis without evidence of acute diverticulitis  APPENDIX:  No findings to suggest appendicitis  ABDOMINOPELVIC CAVITY:  No ascites or free intraperitoneal air  No lymphadenopathy  VESSELS:  Unremarkable for patient's age  PELVIS REPRODUCTIVE ORGANS:  Unremarkable for patient's age  URINARY BLADDER:  Unremarkable  ABDOMINAL WALL/INGUINAL REGIONS:  A superior fat-containing ventral hernia is noted and a more inferior fat and nonobstructed large and small bowel containing right lower quadrant ventral hernia is present  OSSEOUS STRUCTURES:  No acute fracture or destructive osseous lesion  Advanced lumbar degenerative changes present  Impression: No traumatic abnormality identified  Mild to moderate emphysema  Right middle and lower lobe opacities suggesting pneumonia  Cardiomegaly  Cholelithiasis  Ventral hernias as described  Workstation performed: [id number]        Medication Adjustments and Discharge Medications:  · Summary of Medication Adjustments made as a result of this hospitalization:  None  · Medication Dosing Tapers - Please refer to Discharge Medication List for details on any medication dosing tapers (if applicable to patient)  · Discharge Medication List: See after visit summary for reconciled discharge medications       Wound Care Recommendations:  When applicable, please see wound care section of After Visit Summary  Diet Recommendations at Discharge:  Diet -        Diet Orders   (From admission, onward)             Start     Ordered    01/06/20 1147  Dietary nutrition supplements  Once     Question Answer Comment   Select Supplement: Jessica Danielle    Frequency Lunch        01/06/20 1146    01/05/20 1330  Diet Regular; Regular House  Diet effective now     Question Answer Comment   Diet Type Regular    Regular Regular House    RD to adjust diet per protocol? Yes        01/05/20 1329                  Incidental Findings:   · None     Test Results Pending at Discharge (will require follow up): · None         Hospital Course:     Justo Eisenberg is a 71 y o  female patient who originally presented to the hospital on 1/4/2020 due to shortness of breath  Patient treated for pneumonia, COPD exacerbation and showed significant improvement  Her hospital stay was significant for hypoxia especially at night and off oxygen which could be multifactorial includes pneumonia, obstructive sleep apnea, obesity hyperventilation syndrome  Patient advised to follow up with Pulmonary and probably needs a sleep study however patient declines for sleep study as well as BiPAP use  Patient priorly was on oxygen 4 L at rest and 6 L with exertion which later was discontinued after following up with her primary care physician  Patient now needs 24 hours oxygen of 4 L at rest and 6 L with exertion and to follow up with her primary care physician  It is advised that home oxygen assessment needs to be made before discharge from rehab  Antibiotics given to finished a course of treatment  Taper dose of steroid also sent to rehab  Patient advised to follow up with her primary care physician within 3-5 days post discharge  Condition at Discharge: stable     Discharge Day Visit / Exam:     Subjective:  No complaints    Vitals: Blood Pressure: 157/75 (01/08/20 0827)  Pulse: 57 (01/08/20 0827)  Temperature: 97 6 °F (36 4 °C) (01/08/20 0827)  Temp Source: Oral (01/07/20 0800)  Respirations: 19 (01/08/20 0827)  Height: 5' 3" (160 cm) (01/06/20 1107)  Weight - Scale: 102 kg (225 lb 15 5 oz) (01/08/20 0600)  SpO2: 91 % (01/08/20 0827)  Exam:     General appearance: alert, appears stated age and cooperative  Head: Normocephalic, without obvious abnormality, atraumatic  Lungs: clear to auscultation bilaterally  Heart: regular rate and rhythm  Abdomen: soft, non-tender, positive bowel sounds   Back: negative  Extremities: extremities atraumatic, no cyanosis or edema  Neurologic: Alert and oriented X 3, normal strength and tone  Normal symmetric reflexes  Normal coordination and gait      Discharge instructions/Information to patient and family:   See after visit summary section titled Discharge Instructions for information provided to patient and family  Planned Readmission:  No      Discharge Statement:  I spent 35 minutes discharging the patient  This time was spent on the day of discharge  I had direct contact with the patient on the day of discharge  Greater than 50% of the total time was spent examining patient, answering all patient questions, arranging and discussing plan of care with patient as well as directly providing post-discharge instructions  Additional time then spent on discharge activities      ** Please Note: This note has been constructed using a voice recognition system **

## 2020-01-08 NOTE — ASSESSMENT & PLAN NOTE
· Quit smoking 2 years ago  · Does not appear to be in exacerbation  · CT chest abdomen pelvis shows mild-to-moderate emphysema  · Continuous oxygen for now and to follow up with her primary care physician as an outpatient  · Home oxygen assessment need to be made before discharge from rehab

## 2020-01-08 NOTE — ASSESSMENT & PLAN NOTE
· Platelet is on lower side but stable  Probably related with the acute illness  Follow platelet level as an outpatient with primary care physician

## 2020-01-08 NOTE — CASE MANAGEMENT
Patient was not ready for dc yesterday related to worsening PNA  On iv antibiotics, 02  DC plan is [facility] for [employer] CM will continue to follow

## 2020-01-08 NOTE — DISCHARGE INSTRUCTIONS
Patient treated for pneumonia and COPD exacerbation  Patient has chronic respiratory failure on 24 hour home oxygen; 4 L at rest and 6 L on exertion  Continue p o  Antibiotics for 5 more days and p o  Steroid for 12 more days as prescribed  Please send patient to ER if you notice fever, shortness of breath, worsening cough, fatigue, loss of appetite

## 2020-01-08 NOTE — ASSESSMENT & PLAN NOTE
· Initial presentation was for frequent falls and increased weakness  · PT, OT, case management consult appreciated  · Patient is medically stable to go to rehab

## 2020-01-08 NOTE — ASSESSMENT & PLAN NOTE
· Uses Exelon patch    Her dose of 13 3 mg per 24 hours is not formulary, family brought the patches from home  · Continue home dose [employer]

## 2020-01-08 NOTE — ASSESSMENT & PLAN NOTE
· Initial presentation was for frequent falls and increased weakness  · Treated for right middle and lower lobe pneumonia and responded very well  · Off oxygen patient has desaturation to mid 80s and on 3 L oxygen saturation is between 94-98%  · Patient previously on 24 hour oxygen 4 L at rest and 6 L with exertion  As per patient's family patient was slowly tapered off of oxygen and was comfortable on room air after discussing with her primary care physician  Patient advised to keep the oxygen for 24 hours for now and follow up with primary care physician to see her clinical response  Patient is stent with Augmentin to complete her course of antibiotics

## 2020-01-08 NOTE — ASSESSMENT & PLAN NOTE
· BNP 2385, EF 55% with apical and inferior hypokinesis, grade 1 diastolic dysfunction    · Does not appear volume overloaded on exam  · Continue home dose Lasix and follow with Cardiology as an outpatient

## 2020-01-08 NOTE — CASE MANAGEMENT
Discussed in rounds:  Potential dc today post MD examination  MD informed me patient is ready for dc  CM was unaware of discharge today  CM spoke to patient at the bedside, reviewed DC IMM with patient and informed that patient can stay an additional 4 hours for reconsidering appealing the discharge as the medicare rights were review on the day of discharge  Pt verbalized understanding and feels ready to a facility today  She does not intend to stay 4 hours to reconsider  CM offered to call  in regards to dc plans, pt declined she will herself  Discussed OOP expense for [facility] transport would be her responsibility, patient acknowledged as is fine with this as long as she is being billed  [facility] can accept today, contact numbers placed in navigator  Spoke to Bedside nurse  Will set up transport once determination is made that patient does not need 02  Informed that patient will require 02 for dc  MD will order 4 liters,informed facility  Medical necessity completed for BLS transport  Copy placed in the bin to be scanned and CM also placed in chart for nursing to provide EMS  Transportation set up for 1300 with [facility], bedside nurse is aware  Also updated Facility of  time

## 2020-01-08 NOTE — TRANSPORTATION MEDICAL NECESSITY
Section I - General Information    Name of Patient: Ashish Reyes                 : 1955    Medicare #: [id number]  Transport Date: 20 (PCS is valid for round trips on this date and for all repetitive trips in the 60-day range as noted below )  Origin: [address]: [facility] [place] [facility]  Is the pt's stay covered under Medicare Part A (PPS/DRG)   [x]     Closest appropriate facility? If no, why is transport to more distant facility required? Yes  If hospice pt, is this transport related to pt's terminal illness? No       Section II - Medical Necessity Questionnaire  Ambulance transportation is medically necessary only if other means of transport are contraindicated or would be potentially harmful to the patient  To meet this requirement, the patient must either be "bed confined" or suffer from a condition such that transport by means other than ambulance is contraindicated by the patient's condition  The following questions must be answered by the medical professional signing below for this form to be valid:    1)  Describe the MEDICAL CONDITION (physical and/or mental) of this patient AT [address] [place] [address] that requires the patient to be transported in an ambulance and why transport by other means is contraindicated by the patient's condition: patient here with PNA and hx of falls, going to facility on  at 4 liters that can not be self administered or manage     2) Is the patient "bed confined" as defined below? No  To be "be confined" the patient must satisfy all three of the following conditions: (1) unable to get up from bed without Assistance; AND (2) unable to ambulate; AND (3) unable to sit in a chair or wheelchair  3) Can this patient safely be transported by car or wheelchair van (i e , seated during transport without a medical attendant or monitoring)?    No    4) In addition to completing questions 1-3 above, please check any of the following conditions that apply*:   *Note: supporting documentation for any boxes checked must be maintained in the patient's medical records  If hosp-hosp transfer, describe services needed at 2nd facility not available at 1st facility? Medical attendant required   Requires oxygen-unable to self administer      Section III - Signature of Physician or Healthcare Professional  I certify that the above information is true and correct based on my evaluation of this patient, and represent that the patient requires transport by ambulance and that other forms of transport are contraindicated  I understand that this information will be used by the Centers for Medicare and Medicaid Services (CMS) to support the determination of medical necessity for ambulance services, and I represent that I have personal knowledge of the patient's condition at time of transport  []  If this box is checked, I also certify that the patient is physically or mentally incapable of signing the ambulance service's claim and that the institution with which I am affiliated has furnished care, services, or assistance to the patient  My signature below is made on behalf of the patient pursuant to 42 CFR §424 36(b)(4)  In accordance with 42 CFR §424 37, the specific reason(s) that the patient is physically or mentally incapable of signing the claim form is as follows: NA       Signature of Physician* or Healthcare Professional______________________________________________________________  Signature Date 01/08/20 (For scheduled repetitive transports, this form is not valid for transports performed more than 60 days after this date)    Printed Name & Credentials of Physician or Healthcare Professional (MD, DO, RN, etc ) Richi Meyer _____________________________  *Form must be signed by patient's attending physician for scheduled, repetitive transports   For non-repetitive, unscheduled ambulance transports, if unable to obtain the signature of the attending physician, any of the following may sign (choose appropriate option below)  [] Physician Assistant []  Clinical Nurse Specialist []  Registered Nurse  []  Nurse Practitioner  [x] Discharge Planner

## 2020-01-08 NOTE — ASSESSMENT & PLAN NOTE
Lab Results   Component Value Date    HGBA1C 5 2 01/06/2020       No results for input(s): POCGLU in the last 72 hours      Blood Sugar Average: Last 72 hrs:     · Continue to monitor as an outpatient

## 2020-01-08 NOTE — NURSING NOTE
Peripheral IV removed  D/C instructions and after visit summary reviewed with [facility]  Script for [employer] [place] and D/C paperwork given to [employer]

## 2020-01-09 ENCOUNTER — TELEPHONE (OUTPATIENT)
Dept: OBGYN CLINIC | Facility: HOSPITAL | Age: 65
End: 2020-01-09

## 2020-01-09 LAB
BACTERIA BLD CULT: NORMAL
BACTERIA BLD CULT: NORMAL

## 2020-01-09 NOTE — TELEPHONE ENCOUNTER
Called pt  And left message with ortho phone number   If pt  Calls back ask if we can make a follow up appt  And have pt  Scheduled